# Patient Record
Sex: MALE | Race: WHITE | NOT HISPANIC OR LATINO | Employment: FULL TIME | URBAN - METROPOLITAN AREA
[De-identification: names, ages, dates, MRNs, and addresses within clinical notes are randomized per-mention and may not be internally consistent; named-entity substitution may affect disease eponyms.]

---

## 2020-12-10 ENCOUNTER — HOSPITAL ENCOUNTER (OUTPATIENT)
Dept: GENERAL RADIOLOGY | Facility: HOSPITAL | Age: 50
Discharge: HOME | End: 2020-12-10

## 2020-12-10 ENCOUNTER — APPOINTMENT (OUTPATIENT)
Dept: PREADMISSION TESTING | Facility: HOSPITAL | Age: 50
End: 2020-12-10

## 2020-12-10 VITALS — HEIGHT: 69 IN | WEIGHT: 184.6 LBS | BODY MASS INDEX: 27.34 KG/M2

## 2020-12-10 LAB
ABO GROUP BLD: NORMAL
ALBUMIN SERPL-MCNC: 5 G/DL (ref 3.5–5.2)
ALBUMIN/GLOB SERPL: 1.7 G/DL
ALP SERPL-CCNC: 76 U/L (ref 39–117)
ALT SERPL W P-5'-P-CCNC: 14 U/L (ref 1–41)
ANION GAP SERPL CALCULATED.3IONS-SCNC: 9 MMOL/L (ref 5–15)
AST SERPL-CCNC: 14 U/L (ref 1–40)
BASOPHILS # BLD AUTO: 0.07 10*3/MM3 (ref 0–0.2)
BASOPHILS NFR BLD AUTO: 1.2 % (ref 0–1.5)
BILIRUB SERPL-MCNC: 0.8 MG/DL (ref 0–1.2)
BILIRUB UR QL STRIP: NEGATIVE
BUN SERPL-MCNC: 11 MG/DL (ref 6–20)
BUN/CREAT SERPL: 10.7 (ref 7–25)
CALCIUM SPEC-SCNC: 10.4 MG/DL (ref 8.6–10.5)
CHLORIDE SERPL-SCNC: 99 MMOL/L (ref 98–107)
CLARITY UR: CLEAR
CO2 SERPL-SCNC: 30 MMOL/L (ref 22–29)
COLOR UR: YELLOW
CREAT SERPL-MCNC: 1.03 MG/DL (ref 0.76–1.27)
CRP SERPL-MCNC: 0.07 MG/DL (ref 0–0.5)
DEPRECATED RDW RBC AUTO: 36.9 FL (ref 37–54)
EOSINOPHIL # BLD AUTO: 0.07 10*3/MM3 (ref 0–0.4)
EOSINOPHIL NFR BLD AUTO: 1.2 % (ref 0.3–6.2)
ERYTHROCYTE [DISTWIDTH] IN BLOOD BY AUTOMATED COUNT: 11.9 % (ref 12.3–15.4)
ERYTHROCYTE [SEDIMENTATION RATE] IN BLOOD: 4 MM/HR (ref 0–15)
GFR SERPL CREATININE-BSD FRML MDRD: 77 ML/MIN/1.73
GLOBULIN UR ELPH-MCNC: 2.9 GM/DL
GLUCOSE SERPL-MCNC: 89 MG/DL (ref 65–99)
GLUCOSE UR STRIP-MCNC: NEGATIVE MG/DL
HBA1C MFR BLD: 5.1 % (ref 4.8–5.6)
HCT VFR BLD AUTO: 48.5 % (ref 37.5–51)
HGB BLD-MCNC: 16.6 G/DL (ref 13–17.7)
HGB UR QL STRIP.AUTO: NEGATIVE
IMM GRANULOCYTES # BLD AUTO: 0.01 10*3/MM3 (ref 0–0.05)
IMM GRANULOCYTES NFR BLD AUTO: 0.2 % (ref 0–0.5)
KETONES UR QL STRIP: NEGATIVE
LEUKOCYTE ESTERASE UR QL STRIP.AUTO: NEGATIVE
LYMPHOCYTES # BLD AUTO: 2.18 10*3/MM3 (ref 0.7–3.1)
LYMPHOCYTES NFR BLD AUTO: 36.9 % (ref 19.6–45.3)
MCH RBC QN AUTO: 29.2 PG (ref 26.6–33)
MCHC RBC AUTO-ENTMCNC: 34.2 G/DL (ref 31.5–35.7)
MCV RBC AUTO: 85.2 FL (ref 79–97)
MONOCYTES # BLD AUTO: 0.33 10*3/MM3 (ref 0.1–0.9)
MONOCYTES NFR BLD AUTO: 5.6 % (ref 5–12)
NEUTROPHILS NFR BLD AUTO: 3.25 10*3/MM3 (ref 1.7–7)
NEUTROPHILS NFR BLD AUTO: 54.9 % (ref 42.7–76)
NITRITE UR QL STRIP: NEGATIVE
NRBC BLD AUTO-RTO: 0 /100 WBC (ref 0–0.2)
PH UR STRIP.AUTO: 6.5 [PH] (ref 5–8)
PLATELET # BLD AUTO: 276 10*3/MM3 (ref 140–450)
PMV BLD AUTO: 9.9 FL (ref 6–12)
POTASSIUM SERPL-SCNC: 4.6 MMOL/L (ref 3.5–5.2)
PROT SERPL-MCNC: 7.9 G/DL (ref 6–8.5)
PROT UR QL STRIP: NEGATIVE
RBC # BLD AUTO: 5.69 10*6/MM3 (ref 4.14–5.8)
RH BLD: NEGATIVE
SODIUM SERPL-SCNC: 138 MMOL/L (ref 136–145)
SP GR UR STRIP: 1.01 (ref 1–1.03)
UROBILINOGEN UR QL STRIP: NORMAL
WBC # BLD AUTO: 5.91 10*3/MM3 (ref 3.4–10.8)

## 2020-12-10 PROCEDURE — 81003 URINALYSIS AUTO W/O SCOPE: CPT

## 2020-12-10 PROCEDURE — 36415 COLL VENOUS BLD VENIPUNCTURE: CPT

## 2020-12-10 PROCEDURE — 86900 BLOOD TYPING SEROLOGIC ABO: CPT

## 2020-12-10 PROCEDURE — 85652 RBC SED RATE AUTOMATED: CPT

## 2020-12-10 PROCEDURE — 86901 BLOOD TYPING SEROLOGIC RH(D): CPT

## 2020-12-10 PROCEDURE — 93005 ELECTROCARDIOGRAM TRACING: CPT

## 2020-12-10 PROCEDURE — 86140 C-REACTIVE PROTEIN: CPT

## 2020-12-10 PROCEDURE — 86870 RBC ANTIBODY IDENTIFICATION: CPT

## 2020-12-10 PROCEDURE — 86850 RBC ANTIBODY SCREEN: CPT

## 2020-12-10 PROCEDURE — 85025 COMPLETE CBC W/AUTO DIFF WBC: CPT

## 2020-12-10 PROCEDURE — 71046 X-RAY EXAM CHEST 2 VIEWS: CPT

## 2020-12-10 PROCEDURE — 86902 BLOOD TYPE ANTIGEN DONOR EA: CPT

## 2020-12-10 PROCEDURE — 80053 COMPREHEN METABOLIC PANEL: CPT

## 2020-12-10 PROCEDURE — 83036 HEMOGLOBIN GLYCOSYLATED A1C: CPT

## 2020-12-10 PROCEDURE — 93010 ELECTROCARDIOGRAM REPORT: CPT | Performed by: INTERNAL MEDICINE

## 2020-12-10 RX ORDER — ESCITALOPRAM OXALATE 10 MG/1
10 TABLET ORAL DAILY
COMMUNITY

## 2020-12-10 ASSESSMENT — HOOS JR
HOOS JR SCORE: 8.104
HOOS JR SCORE: 23

## 2020-12-10 NOTE — PAT
Patient to apply Chlorhexadine wipes  to surgical area (as instructed) the night before procedure and the AM of procedure. Wipes provided.    Patient instructed to drink 20 ounces (or until full) of Gatorade and it needs to be completed 1 hour before given arrival time for procedure (NO RED Gatorade)    Patient verbalized understanding.    Chest x-ray after PAT.     Joint book given to pt, states he will watch on line videos.

## 2020-12-11 LAB
ANTI-C: NORMAL
BLD GP AB SCN SERPL QL: POSITIVE
QT INTERVAL: 386 MS
QTC INTERVAL: 378 MS

## 2020-12-11 PROCEDURE — 86902 BLOOD TYPE ANTIGEN DONOR EA: CPT

## 2020-12-18 ENCOUNTER — ANESTHESIA EVENT (OUTPATIENT)
Dept: PERIOP | Facility: HOSPITAL | Age: 50
End: 2020-12-18

## 2020-12-18 ENCOUNTER — APPOINTMENT (OUTPATIENT)
Dept: PREADMISSION TESTING | Facility: HOSPITAL | Age: 50
End: 2020-12-18

## 2020-12-18 PROCEDURE — U0004 COV-19 TEST NON-CDC HGH THRU: HCPCS | Performed by: INTERNAL MEDICINE

## 2020-12-19 LAB — SARS-COV-2 RNA RESP QL NAA+PROBE: NOT DETECTED

## 2020-12-21 ENCOUNTER — ANESTHESIA (OUTPATIENT)
Dept: PERIOP | Facility: HOSPITAL | Age: 50
End: 2020-12-21

## 2020-12-21 ENCOUNTER — HOSPITAL ENCOUNTER (OUTPATIENT)
Facility: HOSPITAL | Age: 50
Discharge: HOME | End: 2020-12-22
Attending: ORTHOPAEDIC SURGERY | Admitting: INTERNAL MEDICINE

## 2020-12-21 ENCOUNTER — APPOINTMENT (OUTPATIENT)
Dept: GENERAL RADIOLOGY | Facility: HOSPITAL | Age: 50
End: 2020-12-21

## 2020-12-21 DIAGNOSIS — M16.11 ARTHRITIS OF RIGHT HIP: ICD-10-CM

## 2020-12-21 DIAGNOSIS — Z96.641 STATUS POST TOTAL HIP REPLACEMENT, RIGHT: Primary | ICD-10-CM

## 2020-12-21 PROBLEM — F41.9 ANXIETY: Status: ACTIVE | Noted: 2020-12-21

## 2020-12-21 PROBLEM — K21.9 GERD (GASTROESOPHAGEAL REFLUX DISEASE): Status: ACTIVE | Noted: 2020-12-21

## 2020-12-21 LAB
ABO GROUP BLD: NORMAL
ANTI-C: NORMAL
BLD GP AB SCN SERPL QL: POSITIVE
RH BLD: NEGATIVE
T&S EXPIRATION DATE: NORMAL

## 2020-12-21 PROCEDURE — 86900 BLOOD TYPING SEROLOGIC ABO: CPT | Performed by: ORTHOPAEDIC SURGERY

## 2020-12-21 PROCEDURE — 63710000001 PROMETHAZINE PER 12.5 MG: Performed by: ORTHOPAEDIC SURGERY

## 2020-12-21 PROCEDURE — 86870 RBC ANTIBODY IDENTIFICATION: CPT | Performed by: ORTHOPAEDIC SURGERY

## 2020-12-21 PROCEDURE — 25010000002 MIDAZOLAM PER 1 MG: Performed by: ANESTHESIOLOGY

## 2020-12-21 PROCEDURE — 86922 COMPATIBILITY TEST ANTIGLOB: CPT

## 2020-12-21 PROCEDURE — 25010000003 CEFAZOLIN IN DEXTROSE 2-4 GM/100ML-% SOLUTION: Performed by: ORTHOPAEDIC SURGERY

## 2020-12-21 PROCEDURE — 25010000002 KETOROLAC TROMETHAMINE PER 15 MG: Performed by: ORTHOPAEDIC SURGERY

## 2020-12-21 PROCEDURE — 73502 X-RAY EXAM HIP UNI 2-3 VIEWS: CPT

## 2020-12-21 PROCEDURE — 86901 BLOOD TYPING SEROLOGIC RH(D): CPT | Performed by: ORTHOPAEDIC SURGERY

## 2020-12-21 PROCEDURE — 25010000002 DEXAMETHASONE PER 1 MG: Performed by: NURSE ANESTHETIST, CERTIFIED REGISTERED

## 2020-12-21 PROCEDURE — 76000 FLUOROSCOPY <1 HR PHYS/QHP: CPT

## 2020-12-21 PROCEDURE — 86902 BLOOD TYPE ANTIGEN DONOR EA: CPT

## 2020-12-21 PROCEDURE — 25010000002 PROPOFOL 10 MG/ML EMULSION: Performed by: NURSE ANESTHETIST, CERTIFIED REGISTERED

## 2020-12-21 PROCEDURE — 86850 RBC ANTIBODY SCREEN: CPT | Performed by: ORTHOPAEDIC SURGERY

## 2020-12-21 PROCEDURE — 27130 TOTAL HIP ARTHROPLASTY: CPT | Performed by: PHYSICIAN ASSISTANT

## 2020-12-21 PROCEDURE — 25010000002 ROPIVACAINE PER 1 MG: Performed by: ORTHOPAEDIC SURGERY

## 2020-12-21 PROCEDURE — 86920 COMPATIBILITY TEST SPIN: CPT

## 2020-12-21 PROCEDURE — 25010000002 ONDANSETRON PER 1 MG: Performed by: NURSE ANESTHETIST, CERTIFIED REGISTERED

## 2020-12-21 PROCEDURE — 25010000002 FENTANYL CITRATE (PF) 100 MCG/2ML SOLUTION: Performed by: NURSE ANESTHETIST, CERTIFIED REGISTERED

## 2020-12-21 PROCEDURE — 25010000002 CEFAZOLIN PER 500 MG: Performed by: ORTHOPAEDIC SURGERY

## 2020-12-21 PROCEDURE — C1776 JOINT DEVICE (IMPLANTABLE): HCPCS | Performed by: ORTHOPAEDIC SURGERY

## 2020-12-21 DEVICE — POLARSTEM COLLAR STANDARD                                    NON-CEMENTED WITH TI/HA 6
Type: IMPLANTABLE DEVICE | Site: HIP | Status: FUNCTIONAL
Brand: POLARSTEM

## 2020-12-21 DEVICE — R3 3 HOLE ACETABULAR SHELL 56MM
Type: IMPLANTABLE DEVICE | Site: HIP | Status: FUNCTIONAL
Brand: R3 ACETABULAR

## 2020-12-21 DEVICE — R3 0 DEGREE XLPE ACETABULAR LINER                                    36MM INNER DIAMETER X OUTER DIAMETER 56MM
Type: IMPLANTABLE DEVICE | Site: HIP | Status: FUNCTIONAL
Brand: R3

## 2020-12-21 DEVICE — REFLECTION SPHERICAL HEAD SCREW 25MM
Type: IMPLANTABLE DEVICE | Site: HIP | Status: FUNCTIONAL
Brand: REFLECTION

## 2020-12-21 DEVICE — OXINIUM FEMORAL HEAD 12/14 TAPER                                    36 MM M/+4
Type: IMPLANTABLE DEVICE | Site: HIP | Status: FUNCTIONAL
Brand: OXINIUM

## 2020-12-21 DEVICE — IMPLANTABLE DEVICE: Type: IMPLANTABLE DEVICE | Site: HIP | Status: FUNCTIONAL

## 2020-12-21 RX ORDER — ESCITALOPRAM OXALATE 10 MG/1
10 TABLET ORAL DAILY
Status: DISCONTINUED | OUTPATIENT
Start: 2020-12-21 | End: 2020-12-22 | Stop reason: HOSPADM

## 2020-12-21 RX ORDER — ACETAMINOPHEN, ASPIRIN AND CAFFEINE 250; 250; 65 MG/1; MG/1; MG/1
1 TABLET, FILM COATED ORAL EVERY 6 HOURS PRN
Status: DISCONTINUED | OUTPATIENT
Start: 2020-12-21 | End: 2020-12-22 | Stop reason: HOSPADM

## 2020-12-21 RX ORDER — PANTOPRAZOLE SODIUM 40 MG/1
40 TABLET, DELAYED RELEASE ORAL DAILY
Status: DISCONTINUED | OUTPATIENT
Start: 2020-12-21 | End: 2020-12-22 | Stop reason: HOSPADM

## 2020-12-21 RX ORDER — PROMETHAZINE HYDROCHLORIDE 25 MG/1
25 TABLET ORAL ONCE AS NEEDED
Status: DISCONTINUED | OUTPATIENT
Start: 2020-12-21 | End: 2020-12-21 | Stop reason: HOSPADM

## 2020-12-21 RX ORDER — PROMETHAZINE HYDROCHLORIDE 12.5 MG/1
12.5 TABLET ORAL EVERY 6 HOURS PRN
Status: DISCONTINUED | OUTPATIENT
Start: 2020-12-21 | End: 2020-12-22 | Stop reason: HOSPADM

## 2020-12-21 RX ORDER — ACETAMINOPHEN, ASPIRIN AND CAFFEINE 250; 250; 65 MG/1; MG/1; MG/1
1 TABLET, FILM COATED ORAL EVERY 6 HOURS PRN
COMMUNITY

## 2020-12-21 RX ORDER — KETOROLAC TROMETHAMINE 15 MG/ML
15 INJECTION, SOLUTION INTRAMUSCULAR; INTRAVENOUS EVERY 6 HOURS PRN
Status: DISCONTINUED | OUTPATIENT
Start: 2020-12-21 | End: 2020-12-22 | Stop reason: HOSPADM

## 2020-12-21 RX ORDER — FAMOTIDINE 20 MG/1
20 TABLET, FILM COATED ORAL ONCE
Status: COMPLETED | OUTPATIENT
Start: 2020-12-21 | End: 2020-12-21

## 2020-12-21 RX ORDER — LIDOCAINE HYDROCHLORIDE 10 MG/ML
0.5 INJECTION, SOLUTION EPIDURAL; INFILTRATION; INTRACAUDAL; PERINEURAL ONCE AS NEEDED
Status: COMPLETED | OUTPATIENT
Start: 2020-12-21 | End: 2020-12-21

## 2020-12-21 RX ORDER — MAGNESIUM HYDROXIDE 1200 MG/15ML
LIQUID ORAL AS NEEDED
Status: DISCONTINUED | OUTPATIENT
Start: 2020-12-21 | End: 2020-12-21 | Stop reason: HOSPADM

## 2020-12-21 RX ORDER — CEFAZOLIN SODIUM 2 G/100ML
2 INJECTION, SOLUTION INTRAVENOUS ONCE
Status: COMPLETED | OUTPATIENT
Start: 2020-12-21 | End: 2020-12-21

## 2020-12-21 RX ORDER — FAMOTIDINE 10 MG/ML
20 INJECTION, SOLUTION INTRAVENOUS ONCE
Status: DISCONTINUED | OUTPATIENT
Start: 2020-12-21 | End: 2020-12-21 | Stop reason: HOSPADM

## 2020-12-21 RX ORDER — SODIUM CHLORIDE, SODIUM LACTATE, POTASSIUM CHLORIDE, CALCIUM CHLORIDE 600; 310; 30; 20 MG/100ML; MG/100ML; MG/100ML; MG/100ML
9 INJECTION, SOLUTION INTRAVENOUS CONTINUOUS
Status: DISCONTINUED | OUTPATIENT
Start: 2020-12-21 | End: 2020-12-22 | Stop reason: HOSPADM

## 2020-12-21 RX ORDER — SODIUM CHLORIDE 9 MG/ML
150 INJECTION, SOLUTION INTRAVENOUS CONTINUOUS
Status: DISCONTINUED | OUTPATIENT
Start: 2020-12-21 | End: 2020-12-22 | Stop reason: HOSPADM

## 2020-12-21 RX ORDER — ASPIRIN 325 MG
325 TABLET ORAL DAILY
Status: DISCONTINUED | OUTPATIENT
Start: 2020-12-22 | End: 2020-12-22 | Stop reason: HOSPADM

## 2020-12-21 RX ORDER — ONDANSETRON 2 MG/ML
INJECTION INTRAMUSCULAR; INTRAVENOUS AS NEEDED
Status: DISCONTINUED | OUTPATIENT
Start: 2020-12-21 | End: 2020-12-21 | Stop reason: SURG

## 2020-12-21 RX ORDER — MIDAZOLAM HYDROCHLORIDE 1 MG/ML
1 INJECTION INTRAMUSCULAR; INTRAVENOUS
Status: COMPLETED | OUTPATIENT
Start: 2020-12-21 | End: 2020-12-21

## 2020-12-21 RX ORDER — BUPIVACAINE HYDROCHLORIDE 5 MG/ML
INJECTION, SOLUTION PERINEURAL
Status: COMPLETED | OUTPATIENT
Start: 2020-12-21 | End: 2020-12-21

## 2020-12-21 RX ORDER — HYDROCODONE BITARTRATE AND ACETAMINOPHEN 5; 325 MG/1; MG/1
1 TABLET ORAL EVERY 4 HOURS PRN
Status: DISCONTINUED | OUTPATIENT
Start: 2020-12-21 | End: 2020-12-22 | Stop reason: HOSPADM

## 2020-12-21 RX ORDER — HYDROMORPHONE HYDROCHLORIDE 1 MG/ML
0.5 INJECTION, SOLUTION INTRAMUSCULAR; INTRAVENOUS; SUBCUTANEOUS
Status: DISCONTINUED | OUTPATIENT
Start: 2020-12-21 | End: 2020-12-21 | Stop reason: HOSPADM

## 2020-12-21 RX ORDER — DEXAMETHASONE SODIUM PHOSPHATE 4 MG/ML
INJECTION, SOLUTION INTRA-ARTICULAR; INTRALESIONAL; INTRAMUSCULAR; INTRAVENOUS; SOFT TISSUE AS NEEDED
Status: DISCONTINUED | OUTPATIENT
Start: 2020-12-21 | End: 2020-12-21 | Stop reason: SURG

## 2020-12-21 RX ORDER — CEFAZOLIN SODIUM 2 G/100ML
2 INJECTION, SOLUTION INTRAVENOUS EVERY 8 HOURS
Status: COMPLETED | OUTPATIENT
Start: 2020-12-21 | End: 2020-12-22

## 2020-12-21 RX ORDER — SODIUM CHLORIDE 0.9 % (FLUSH) 0.9 %
10 SYRINGE (ML) INJECTION EVERY 12 HOURS SCHEDULED
Status: DISCONTINUED | OUTPATIENT
Start: 2020-12-21 | End: 2020-12-21 | Stop reason: HOSPADM

## 2020-12-21 RX ORDER — FENTANYL CITRATE 50 UG/ML
50 INJECTION, SOLUTION INTRAMUSCULAR; INTRAVENOUS
Status: DISCONTINUED | OUTPATIENT
Start: 2020-12-21 | End: 2020-12-21 | Stop reason: HOSPADM

## 2020-12-21 RX ORDER — HYDROMORPHONE HYDROCHLORIDE 1 MG/ML
0.5 INJECTION, SOLUTION INTRAMUSCULAR; INTRAVENOUS; SUBCUTANEOUS
Status: DISCONTINUED | OUTPATIENT
Start: 2020-12-21 | End: 2020-12-22 | Stop reason: HOSPADM

## 2020-12-21 RX ORDER — PREGABALIN 75 MG/1
75 CAPSULE ORAL ONCE
Status: COMPLETED | OUTPATIENT
Start: 2020-12-21 | End: 2020-12-21

## 2020-12-21 RX ORDER — LABETALOL HYDROCHLORIDE 5 MG/ML
10 INJECTION, SOLUTION INTRAVENOUS EVERY 4 HOURS PRN
Status: DISCONTINUED | OUTPATIENT
Start: 2020-12-21 | End: 2020-12-22 | Stop reason: HOSPADM

## 2020-12-21 RX ORDER — PROMETHAZINE HYDROCHLORIDE 25 MG/1
25 SUPPOSITORY RECTAL ONCE AS NEEDED
Status: DISCONTINUED | OUTPATIENT
Start: 2020-12-21 | End: 2020-12-21 | Stop reason: HOSPADM

## 2020-12-21 RX ORDER — SODIUM CHLORIDE 0.9 % (FLUSH) 0.9 %
10 SYRINGE (ML) INJECTION AS NEEDED
Status: DISCONTINUED | OUTPATIENT
Start: 2020-12-21 | End: 2020-12-21 | Stop reason: HOSPADM

## 2020-12-21 RX ORDER — NALOXONE HCL 0.4 MG/ML
0.1 VIAL (ML) INJECTION
Status: DISCONTINUED | OUTPATIENT
Start: 2020-12-21 | End: 2020-12-22 | Stop reason: HOSPADM

## 2020-12-21 RX ADMIN — HYDROCODONE BITARTRATE AND ACETAMINOPHEN 1 TABLET: 5; 325 TABLET ORAL at 19:46

## 2020-12-21 RX ADMIN — PROMETHAZINE HYDROCHLORIDE 12.5 MG: 12.5 TABLET ORAL at 20:17

## 2020-12-21 RX ADMIN — PANTOPRAZOLE SODIUM 40 MG: 40 TABLET, DELAYED RELEASE ORAL at 18:55

## 2020-12-21 RX ADMIN — ONDANSETRON 4 MG: 2 INJECTION INTRAMUSCULAR; INTRAVENOUS at 14:52

## 2020-12-21 RX ADMIN — SODIUM CHLORIDE 150 ML/HR: 9 INJECTION, SOLUTION INTRAVENOUS at 18:56

## 2020-12-21 RX ADMIN — Medication 1000 MG: at 14:52

## 2020-12-21 RX ADMIN — FAMOTIDINE 20 MG: 20 TABLET, FILM COATED ORAL at 09:14

## 2020-12-21 RX ADMIN — ESCITALOPRAM OXALATE 10 MG: 10 TABLET ORAL at 18:55

## 2020-12-21 RX ADMIN — BUPIVACAINE HYDROCHLORIDE 1.8 ML: 5 INJECTION, SOLUTION PERINEURAL at 13:06

## 2020-12-21 RX ADMIN — LIDOCAINE HYDROCHLORIDE 0.5 ML: 10 INJECTION, SOLUTION EPIDURAL; INFILTRATION; INTRACAUDAL; PERINEURAL at 09:13

## 2020-12-21 RX ADMIN — KETOROLAC TROMETHAMINE 15 MG: 15 INJECTION, SOLUTION INTRAMUSCULAR; INTRAVENOUS at 21:10

## 2020-12-21 RX ADMIN — MIDAZOLAM 1 MG: 1 INJECTION INTRAMUSCULAR; INTRAVENOUS at 09:44

## 2020-12-21 RX ADMIN — MIDAZOLAM 1 MG: 1 INJECTION INTRAMUSCULAR; INTRAVENOUS at 09:53

## 2020-12-21 RX ADMIN — MUPIROCIN 1 APPLICATION: 20 OINTMENT TOPICAL at 09:14

## 2020-12-21 RX ADMIN — CEFAZOLIN SODIUM 2 G: 10 INJECTION, POWDER, FOR SOLUTION INTRAVENOUS at 21:38

## 2020-12-21 RX ADMIN — SODIUM CHLORIDE, POTASSIUM CHLORIDE, SODIUM LACTATE AND CALCIUM CHLORIDE 9 ML/HR: 600; 310; 30; 20 INJECTION, SOLUTION INTRAVENOUS at 09:14

## 2020-12-21 RX ADMIN — FENTANYL CITRATE 50 MCG: 50 INJECTION, SOLUTION INTRAMUSCULAR; INTRAVENOUS at 15:40

## 2020-12-21 RX ADMIN — PROPOFOL 88 MCG/KG/MIN: 10 INJECTION, EMULSION INTRAVENOUS at 13:11

## 2020-12-21 RX ADMIN — DEXAMETHASONE SODIUM PHOSPHATE 8 MG: 4 INJECTION, SOLUTION INTRAMUSCULAR; INTRAVENOUS at 13:11

## 2020-12-21 RX ADMIN — PREGABALIN 75 MG: 75 CAPSULE ORAL at 09:14

## 2020-12-21 RX ADMIN — Medication 1000 MG: at 13:11

## 2020-12-21 RX ADMIN — CEFAZOLIN SODIUM 2 G: 2 INJECTION, SOLUTION INTRAVENOUS at 13:01

## 2020-12-21 NOTE — OP NOTE
TOTAL HIP ARTHROPLASTY ANTERIOR  Progress Note    Dominguez Calderon  12/21/2020    Pre-op Diagnosis:   Right hip osteoarthritis       Post-Op Diagnosis Codes:  Right hip osteoarthritis    Procedure/CPT® Codes:  70102    Procedure(s):  TOTAL ANTERIOR RIGHT HIP ARTHROPLASTY    Surgeon(s):  Jared Mario MD    Anesthesia: Spinal    Staff:   Circulator: Sofia Marion RN; Faustino Kwon RN  Radiology Technologist: Noble Wong  Scrub Person: Matilde, April; Sheila Kaye  Vendor Representative: Collin Hyatt  Nursing Assistant: Tonya Quintanilla; Shama Rolon PCT  Assistant: Stephanie Price PA  Assistant: Stephanie Price PA      Estimated Blood Loss: 500 mL    Urine Voided: * No values recorded between 12/21/2020  1:01 PM and 12/21/2020  3:13 PM *    Specimens:                None          Drains: * No LDAs found *    Findings: Eburnated bone anterior superior lateral.  Moderate anterior inferior impinging osteophyte.    Complications: None    Assistant: Stephanie Price PA  was responsible for performing the following activities: Retraction, Suction, Irrigation, Suturing, Closing, Placing Dressing and Debridement in preparation for component placement and their skilled assistance was necessary for the success of this case.    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 X-rays show complete loss superior joint space with femoral head flattening and lateralization of the femoral head.  Pain is limiting routine daily activities. Risks benefits indications and rationale for total hip arthroplasty discussed at length with patient.  Patient is advised on possibility of perioperative medical complications requiring prolonged hospitalization as well as infectious or  wound complications requiring further surgery.  Patient signs own consent after all questions are answered.      Procedure:  While in the OR spinal anesthetic achieved with satisfactory level. Turned to the supine position and prepped and draped in standard fashion for anterior approach to the right hip on the Meredith table. Fluoroscopy used to assess limb lengths. These were restored with final component selection and position. Incision made over the tensor fascia dann and routine dissection carried down to raise the fascia over the medial margin of the tensor muscle belly. Circumflex vessels were identified and cauterized. Bleeding reasonably controlled with estimated total blood loss 500 mL. Femoral neck isolated. T-shaped capsulotomy created. Mild effusion relieved. Standard neck cut made and head removed without difficulty noting large area of eburnated bone in the anterior and superior lateral aspect of the femoral head and anterior inferior moderate-sized impinging osteophyte. Acetabulum was exposed circumferentially. Acetabulum reamed from size 47/56 noting satisfactory exposure of the subchondral bone with the size 56. Size 56 final component was seated with excellent press-fit characteristics.  1 additional fixation screw was placed due to concern for bone quality medially. Polyethylene insert placed without difficulty. Horizontal tilt was thought to be approximately 40-45° from Hilgenreiner's line. Anteversion approximately 25°. Component was under the anterior inferior acetabular margin. Acetabulum was thoroughly irrigated before during and after component placement.      Attention turned to the proximal femur. Trochanteric hook placed and hip externally rotated eventually to 160° after complete posterior lateral releases. Standard piriformis landmarks were used.  Minor soft stripping from the apex of the greater trochanter was noted.  Broached from size 0/1 to size 6 with good position relative to standard  landmarks. Calcar reamer was passed after trials showed satisfactory recovery of limb lengths.  Retractors initiated the focal rim defect on the posterior aspect involving the calcar.  Final component seated completely relative to the reaming with excellent rotational stability. Limb lengths appeared to be restored best with +4x36 mm head and neck adapter. Final components assembled and reduced.  Stability at this point appear to be good up to 100 degrees with the hip in neutral extension and complete with the hip even slightly flexed.  Wound thoroughly irrigated and closed in layers without a drain. Bleeding appeared to be well controlled at closure. Joint space injected with ropivacaine through a spinal needle placed percutaneously during closure. Standard Satnam dressing applied. Final counts were correct. Patient stable to recovery having tolerated procedure well throughout.          Jared Mario MD     Date: 12/21/2020  Time: 15:17 EST

## 2020-12-21 NOTE — ANESTHESIA PREPROCEDURE EVALUATION
Anesthesia Evaluation     Patient summary reviewed   history of anesthetic complications (reports recall during wisdom teeth extraction at dental office):  NPO Solid Status: > 8 hours  NPO Liquid Status: > 2 hours           Airway   Mallampati: I  TM distance: >3 FB  Neck ROM: full  No difficulty expected  Dental - normal exam     Pulmonary - normal exam   (+) asthma (childhood),  (-) not a smoker  Cardiovascular - normal exam  Exercise tolerance: good (4-7 METS)    ECG reviewed    (-) CAD, angina, CHF, cardiac stents, DVT    ROS comment: 12/10/20 -ekg SB to 58.    Neuro/Psych  (+) dizziness/light headedness, psychiatric history Anxiety,     GI/Hepatic/Renal/Endo    (+)  hiatal hernia, GERD well controlled,    (-) hepatitis, liver disease, no renal disease, diabetes, no thyroid disorder    Musculoskeletal     Abdominal    Substance History - negative use     OB/GYN          Other        ROS/Med Hx Other: hgb 16.6 plt 276 k 4.6  No GERD currently                Anesthesia Plan    ASA 2     spinal and MAC   (Risks and benefits of general anesthesia discussed with patient (including MI, CVA, death, recall, aspiration), questions answered, agreeable to proceed.  Discussed conversion to GA if needed.  +Hx antiC ab.  )  intravenous induction     Anesthetic plan, all risks, benefits, and alternatives have been provided, discussed and informed consent has been obtained with: patient.  Use of blood products discussed with patient  Consented to blood products.   Plan discussed with CRNA.

## 2020-12-21 NOTE — ANESTHESIA POSTPROCEDURE EVALUATION
Patient: Dominguez Calderon    Procedure Summary     Date: 12/21/20 Room / Location:  [facility]  /  [facility]    Anesthesia Start: 1301 Anesthesia Stop: 1522    Procedure: TOTAL ANTERIOR RIGHT HIP ARTHROPLASTY (Right Hip) Diagnosis:     Surgeon: Jared Mario MD Provider: Brigette Prather DO    Anesthesia Type: spinal, MAC ASA Status: 2          Anesthesia Type: spinal, MAC    Vitals  Vitals Value Taken Time   /74 12/21/20 1514   Temp 97.8 °F (36.6 °C) 12/21/20 1513   Pulse 50 12/21/20 1521   Resp 16 12/21/20 1513   SpO2 100 % 12/21/20 1521   Vitals shown include unvalidated device data.        Post Anesthesia Care and Evaluation    Patient location during evaluation: PACU  Patient participation: complete - patient participated  Level of consciousness: awake and alert  Pain management: adequate  Airway patency: patent  Anesthetic complications: No anesthetic complications  PONV Status: none  Cardiovascular status: hemodynamically stable and acceptable  Respiratory status: nonlabored ventilation, acceptable and nasal cannula  Hydration status: acceptable

## 2020-12-21 NOTE — ANESTHESIA PROCEDURE NOTES
Spinal Block      Patient reassessed immediately prior to procedure    Patient location during procedure: OR  Indication:at surgeon's request  Performed By  CRNA: Elli Felix CRNA  Preanesthetic Checklist  Completed: patient identified, site marked, surgical consent, pre-op evaluation, timeout performed, IV checked, risks and benefits discussed and monitors and equipment checked  Spinal Block Prep:  Patient Position:sitting  Sterile Tech:cap, gloves, sterile barriers and mask  Prep:Chloraprep  Patient Monitoring:blood pressure monitoring, continuous pulse oximetry and EKG  Spinal Block Procedure  Approach:midline  Guidance:landmark technique and palpation technique  Location:L4-L5  Needle Type:Sprotte  Needle Gauge:25 G  Placement of Spinal needle event:cerebrospinal fluid aspirated  Paresthesia: no  Fluid Appearance:clear  Medications: bupivacaine (MARCAINE) 0.5 % injection, 1.8 mL  Med Administered at 12/21/2020 1:06 PM   Post Assessment  Patient Tolerance:patient tolerated the procedure well with no apparent complications  Complications no  Additional Notes  Procedure:  Pt assisted to sitting position, with legs in position of comfort over side of bed.  Pt. instructed in optimal spine presentation, the spine was prepped/ Draped and the skin at insertion site was anesthetized with 1% Lidocaine 2 ml.  The spinal needle was then advanced until CSF flow was obtained and LA was injected:

## 2020-12-21 NOTE — H&P
Patient Name: Dominguez Calderon  MRN: [id number]  : 1970  DOS: 2020    Attending: Jared Mario MD    Primary Care Provider: Tj Dunn MD      Chief complaint: Right hip pain    Subjective   Patient is a pleasant 50 y.o. male presented for scheduled surgery by Dr. 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 X-rays show complete loss superior joint space with femoral head flattening and lateralization of the femoral head.  Pain is limiting routine daily activities. Risks benefits indications and rationale for total hip arthroplasty discussed at length with patient.  Patient is advised on possibility of perioperative medical complications requiring prolonged hospitalization as well as infectious or wound complications requiring further surgery.  Patient signs own consent after all questions are answered.)  Patient underwent right total hip arthroplasty, anterior, under spinal anesthesia tolerated surgery well, was admitted for further management.  Seen in his room postoperatively, he is doing well, good pain control.  No complaints of nausea, vomiting, or shortness of breath.  Patient has no history of DVT or PE.    Allergies:  No Known Allergies    Meds:  Medications Prior to Admission   Medication Sig Dispense Refill Last Dose   • aspirin-acetaminophen-caffeine (EXCEDRIN MIGRAINE) 250-250-65 MG per tablet Take 1 tablet by mouth Every 6 (Six) Hours As Needed for Headache.   Past Week   • escitalopram (LEXAPRO) 10 MG tablet Take 10 mg by mouth Daily. Pt takes every other day.   Past Week   • esomeprazole (nexIUM) 20 MG capsule Take 20 mg by mouth As Needed.   Past Week         History:   Past Medical History:   Diagnosis Date   • Acid reflux    • Anxiety    • Hiatal hernia    • Shingles     history of    • Vertigo     history of... has resolved      Past Surgical History:   Procedure Laterality Date   • WISDOM TOOTH  EXTRACTION       History reviewed. No pertinent family history.  Social History     Tobacco Use   • Smoking status: Never Smoker   • Smokeless tobacco: Current User     Types: Chew   Substance Use Topics   • Alcohol use: Not Currently   • Drug use: Not Currently   Special .  .  Lives with his wife in [facility]    Review of Systems  Pertinent items are noted in HPI, all other systems reviewed and negative    Vital Signs  /89 (BP Location: Right arm, Patient Position: Lying)   Pulse 66   Temp 97.8 °F (36.6 °C) (Temporal)   Resp 18   SpO2 100%     Physical Exam:    General Appearance:    Alert, cooperative, in no acute distress   Head:    Normocephalic, without obvious abnormality, atraumatic   Eyes:            Lids and lashes normal, conjunctivae and sclerae normal, no   icterus, no pallor, corneas clear    Ears:    Ears appear intact with no abnormalities noted   Throat:   No oral lesions, no thrush, oral mucosa moist   Neck:   No adenopathy, supple, trachea midline, no thyromegaly         Lungs:     Clear to auscultation,respirations regular, even and   unlabored. No wheezes or rales.    Heart:    Regular rhythm and normal rate, normal S1 and S2, no murmur, no gallop   Abdomen:     Normal bowel sounds, no masses, no organomegaly, soft        non-tender, non-distended, no guarding, no rebound                 tenderness   Genitalia:    Deferred   Extremities:  Right LE, CDI dressing aury dressing system over right hip incision.  No clubbing, cyanosis, or edema distally.   Pulses:   Pulses palpable and equal bilaterally   Skin:   No bleeding, bruising or rash   Neurologic:   Cranial nerves 2 - 12 grossly intact, intact flexion and dorsiflexion bilateral feet.      I reviewed the patient's new clinical results.             Invalid input(s): NEUTOPHILPCT,  EOSPCT        Invalid input(s): LABALBU, PROT  Lab Results   Component Value Date    HGBA1C 5.10 12/10/2020     Results for DAVID  SOLEDAD ARAIZA (MRN [id number]) as of 12/21/2020 21:55   Ref. Range 12/10/2020 17:20   Glucose Latest Ref Range: 65 - 99 mg/dL 89   Sodium Latest Ref Range: 136 - 145 mmol/L 138   Potassium Latest Ref Range: 3.5 - 5.2 mmol/L 4.6   CO2 Latest Ref Range: 22.0 - 29.0 mmol/L 30.0 (H)   Chloride Latest Ref Range: 98 - 107 mmol/L 99   Anion Gap Latest Ref Range: 5.0 - 15.0 mmol/L 9.0   Creatinine Latest Ref Range: 0.76 - 1.27 mg/dL 1.03   BUN Latest Ref Range: 6 - 20 mg/dL 11   BUN/Creatinine Ratio Latest Ref Range: 7.0 - 25.0  10.7   Calcium Latest Ref Range: 8.6 - 10.5 mg/dL 10.4   eGFR Non  Am Latest Ref Range: >60 mL/min/1.73 77   Alkaline Phosphatase Latest Ref Range: 39 - 117 U/L 76   Total Protein Latest Ref Range: 6.0 - 8.5 g/dL 7.9   ALT (SGPT) Latest Ref Range: 1 - 41 U/L 14   AST (SGOT) Latest Ref Range: 1 - 40 U/L 14   Total Bilirubin Latest Ref Range: 0.0 - 1.2 mg/dL 0.8   Albumin Latest Ref Range: 3.50 - 5.20 g/dL 5.00   Globulin Latest Units: gm/dL 2.9   A/G Ratio Latest Units: g/dL 1.7     Results for DAVID SOLEDAD TEODORA (MRN [id number]) as of 12/21/2020 21:55   Ref. Range 12/10/2020 17:20   WBC Latest Ref Range: 3.40 - 10.80 10*3/mm3 5.91   RBC Latest Ref Range: 4.14 - 5.80 10*6/mm3 5.69   Hemoglobin Latest Ref Range: 13.0 - 17.7 g/dL 16.6   Hematocrit Latest Ref Range: 37.5 - 51.0 % 48.5   RDW Latest Ref Range: 12.3 - 15.4 % 11.9 (L)   MCV Latest Ref Range: 79.0 - 97.0 fL 85.2   MCH Latest Ref Range: 26.6 - 33.0 pg 29.2   MCHC Latest Ref Range: 31.5 - 35.7 g/dL 34.2   MPV Latest Ref Range: 6.0 - 12.0 fL 9.9   Platelets Latest Ref Range: 140 - 450 10*3/mm3 276       Assessment and Plan:       Status post total hip replacement, right    Arthritis of right hip    Anxiety    GERD (gastroesophageal reflux disease)      Plan:    1. PT/OT,  Weight bearing as tolerated right LE.  2. Pain control-prns  3. IS-encourage  4. DVT proph- Mechanicals and aspirin  5. Bowel regimen  6. Resume home medications as  appropriate  7. Monitor post-op labs  8. DC planning for home    -Anxiety: Resume home regimen.     -GERD:  Resume PPI.  Formulary substitution when indicated.    Discussed with patient postop management plan, he expressed understanding and agreement.    Dragon disclaimer:  Part of this encounter note is an electronic transcription/translation of spoken language to printed text. The electronic translation of spoken language may permit erroneous, or at times, nonsensical words or phrases to be inadvertently transcribed; Although I have reviewed the note for such errors, some may still exist.    Suraj Byrd MD  12/21/20  14:46 EST

## 2020-12-21 NOTE — PROGRESS NOTES
Spinal anesthesia is never completely resolved.  He still has some mild weakness of toe and ankle dorsiflexors with good sensation of the dorsal foot.  With the hip flexed to about 20 to 25 degrees there appears to be excellent stability of the hip moving into external rotation.  From his description this is close to his functional position prior to surgery.  He is instructed on anterior hip cautions especially keeping the hip forward flexed with gait and with transitions.  He should sleep with 1-2 pillows under the right knee as well as 1 to 2 pills of the shoulder at night.  We will follow him closely and have revision options available if dislocation occurs again.

## 2020-12-21 NOTE — H&P
Pre-Op H&P  Dominguez Calderon  [id number]  1970      Chief complaint: Right hip pain      Subjective:  Patient is a 50 y.o.male presents for scheduled surgery by Dr. Mario. He anticipates a right total hip arthroplasty today. His hip has been painful for many years. He denies use of assistive device for ambulation or recent falls.       Review of Systems:  Constitutional-- No fever, chills or sweats. No fatigue.  CV-- No chest pain, palpitation or syncope  Resp-- No SOB, cough, hemoptysis  Skin--No rashes or lesions      Allergies: No Known Allergies      Home Meds:  Medications Prior to Admission   Medication Sig Dispense Refill Last Dose   • aspirin-acetaminophen-caffeine (EXCEDRIN MIGRAINE) 250-250-65 MG per tablet Take 1 tablet by mouth Every 6 (Six) Hours As Needed for Headache.   Past Week   • escitalopram (LEXAPRO) 10 MG tablet Take 10 mg by mouth Daily. Pt takes every other day.   Past Week   • esomeprazole (nexIUM) 20 MG capsule Take 20 mg by mouth As Needed.   Past Week         PMH:   Past Medical History:   Diagnosis Date   • Acid reflux    • Anxiety    • Hiatal hernia    • Shingles     history of    • Vertigo     history of... has resolved      PSH:    Past Surgical History:   Procedure Laterality Date   • WISDOM TOOTH EXTRACTION         Immunization History:  Influenza: 2020  Pneumococcal: No  Tetanus: No      Social History:   Tobacco:   Social History     Tobacco Use   Smoking Status Never Smoker   Smokeless Tobacco Current User   • Types: Chew      Alcohol:     Social History     Substance and Sexual Activity   Alcohol Use Not Currently         Physical Exam:/89 (BP Location: Right arm, Patient Position: Lying)   Pulse 66   Temp 97.8 °F (36.6 °C) (Temporal)   Resp 18   SpO2 100%       General Appearance:    Alert, cooperative, no distress, appears stated age   Head:    Normocephalic, without obvious abnormality, atraumatic   Lungs:     Clear to auscultation bilaterally, respirations  unlabored    Heart:   Regular rate and rhythm, S1 and S2 normal, no murmur, rub    or gallop    Abdomen:    Soft without tenderness   Breast Exam:    deferred   Genitalia:    deferred   Extremities:   Extremities normal, atraumatic, no cyanosis or edema   Skin:   Skin color, texture, turgor normal, no rashes or lesions   Neurologic:   Grossly intact     Results Review:     LABS:  Lab Results   Component Value Date    WBC 5.91 12/10/2020    HGB 16.6 12/10/2020    HCT 48.5 12/10/2020    MCV 85.2 12/10/2020     12/10/2020    NEUTROABS 3.25 12/10/2020    GLUCOSE 89 12/10/2020    BUN 11 12/10/2020    CREATININE 1.03 12/10/2020    EGFRIFNONA 77 12/10/2020     12/10/2020    K 4.6 12/10/2020    CL 99 12/10/2020    CO2 30.0 (H) 12/10/2020    CALCIUM 10.4 12/10/2020    ALBUMIN 5.00 12/10/2020    AST 14 12/10/2020    ALT 14 12/10/2020    BILITOT 0.8 12/10/2020       RADIOLOGY:  Study Result    EXAMINATION: XR CHEST PA AND LATERAL- 12/10/2020     INDICATION: pre op      COMPARISON: NONE     FINDINGS: PA and lateral views of the chest reveal cardiac and  mediastinal silhouettes within normal limits. The lung fields are  grossly clear. No focal parenchymal opacification present. No pleural  effusion or pneumothorax. The bony sutures are unremarkable. Minimal  degenerative changes seen within the spine. Pulmonary vascularity is  within normal limits.        IMPRESSION:  No acute cardiopulmonary disease.       I reviewed the patient's new clinical results.    Cancer Staging (if applicable)  Cancer Patient: __ yes __no __unknown; If yes, clinical stage T:__ N:__M:__, stage group or __N/A      Impression: Right hip pain      Plan: Right total hip arthroplasty      VIKA Jameson   12/21/2020   09:45 EST

## 2020-12-21 NOTE — PROGRESS NOTES
Immediate anterior subluxation noted in PACU.  This was easily reducible and secured with the hip slightly flexed and abducted.  X-ray showed tilt angle to 40 degrees and anteversion in the 25 to 30 degree range.  Limb lengths appear to be radiographically equal.  We will reassess after spinal anesthesia has resolved to determine if more aggressive measures are required.    Jared Mario MD  4:30 PM  12/21/2020

## 2020-12-22 VITALS
DIASTOLIC BLOOD PRESSURE: 79 MMHG | RESPIRATION RATE: 16 BRPM | HEART RATE: 67 BPM | OXYGEN SATURATION: 97 % | SYSTOLIC BLOOD PRESSURE: 104 MMHG | TEMPERATURE: 98.5 F

## 2020-12-22 PROBLEM — D62 ACUTE BLOOD LOSS ANEMIA: Status: ACTIVE | Noted: 2020-12-22

## 2020-12-22 LAB
ANION GAP SERPL CALCULATED.3IONS-SCNC: 9 MMOL/L (ref 5–15)
BASOPHILS # BLD AUTO: 0.03 10*3/MM3 (ref 0–0.2)
BASOPHILS NFR BLD AUTO: 0.3 % (ref 0–1.5)
BUN SERPL-MCNC: 14 MG/DL (ref 6–20)
BUN/CREAT SERPL: 14.4 (ref 7–25)
CALCIUM SPEC-SCNC: 8.6 MG/DL (ref 8.6–10.5)
CHLORIDE SERPL-SCNC: 102 MMOL/L (ref 98–107)
CO2 SERPL-SCNC: 25 MMOL/L (ref 22–29)
CREAT SERPL-MCNC: 0.97 MG/DL (ref 0.76–1.27)
DEPRECATED RDW RBC AUTO: 37.6 FL (ref 37–54)
EOSINOPHIL # BLD AUTO: 0.01 10*3/MM3 (ref 0–0.4)
EOSINOPHIL NFR BLD AUTO: 0.1 % (ref 0.3–6.2)
ERYTHROCYTE [DISTWIDTH] IN BLOOD BY AUTOMATED COUNT: 11.9 % (ref 12.3–15.4)
GFR SERPL CREATININE-BSD FRML MDRD: 82 ML/MIN/1.73
GLUCOSE SERPL-MCNC: 111 MG/DL (ref 65–99)
HCT VFR BLD AUTO: 34.4 % (ref 37.5–51)
HGB BLD-MCNC: 11.9 G/DL (ref 13–17.7)
IMM GRANULOCYTES # BLD AUTO: 0.03 10*3/MM3 (ref 0–0.05)
IMM GRANULOCYTES NFR BLD AUTO: 0.3 % (ref 0–0.5)
LYMPHOCYTES # BLD AUTO: 1.1 10*3/MM3 (ref 0.7–3.1)
LYMPHOCYTES NFR BLD AUTO: 12.4 % (ref 19.6–45.3)
MCH RBC QN AUTO: 29.9 PG (ref 26.6–33)
MCHC RBC AUTO-ENTMCNC: 34.6 G/DL (ref 31.5–35.7)
MCV RBC AUTO: 86.4 FL (ref 79–97)
MONOCYTES # BLD AUTO: 0.8 10*3/MM3 (ref 0.1–0.9)
MONOCYTES NFR BLD AUTO: 9 % (ref 5–12)
NEUTROPHILS NFR BLD AUTO: 6.87 10*3/MM3 (ref 1.7–7)
NEUTROPHILS NFR BLD AUTO: 77.9 % (ref 42.7–76)
NRBC BLD AUTO-RTO: 0 /100 WBC (ref 0–0.2)
PLATELET # BLD AUTO: 206 10*3/MM3 (ref 140–450)
PMV BLD AUTO: 10.4 FL (ref 6–12)
POTASSIUM SERPL-SCNC: 3.8 MMOL/L (ref 3.5–5.2)
RBC # BLD AUTO: 3.98 10*6/MM3 (ref 4.14–5.8)
SODIUM SERPL-SCNC: 136 MMOL/L (ref 136–145)
WBC # BLD AUTO: 8.84 10*3/MM3 (ref 3.4–10.8)

## 2020-12-22 PROCEDURE — 97116 GAIT TRAINING THERAPY: CPT

## 2020-12-22 PROCEDURE — 97161 PT EVAL LOW COMPLEX 20 MIN: CPT

## 2020-12-22 PROCEDURE — 97110 THERAPEUTIC EXERCISES: CPT

## 2020-12-22 PROCEDURE — 80048 BASIC METABOLIC PNL TOTAL CA: CPT | Performed by: INTERNAL MEDICINE

## 2020-12-22 PROCEDURE — 85025 COMPLETE CBC W/AUTO DIFF WBC: CPT | Performed by: ORTHOPAEDIC SURGERY

## 2020-12-22 PROCEDURE — 25010000002 CEFAZOLIN PER 500 MG: Performed by: ORTHOPAEDIC SURGERY

## 2020-12-22 RX ORDER — DOCUSATE SODIUM 100 MG/1
100 CAPSULE, LIQUID FILLED ORAL 2 TIMES DAILY
Qty: 30 CAPSULE | Refills: 0 | Status: SHIPPED | OUTPATIENT
Start: 2020-12-22 | End: 2021-01-06

## 2020-12-22 RX ORDER — MELOXICAM 15 MG/1
15 TABLET ORAL DAILY
Qty: 15 TABLET | Refills: 0 | Status: SHIPPED | OUTPATIENT
Start: 2020-12-22 | End: 2021-01-06

## 2020-12-22 RX ORDER — ASPIRIN 325 MG
325 TABLET, DELAYED RELEASE (ENTERIC COATED) ORAL DAILY
Qty: 30 TABLET | Refills: 0 | Status: SHIPPED | OUTPATIENT
Start: 2020-12-22 | End: 2021-01-21

## 2020-12-22 RX ORDER — OXYCODONE HYDROCHLORIDE 5 MG/1
5 TABLET ORAL EVERY 4 HOURS PRN
Qty: 40 TABLET | Refills: 0 | Status: SHIPPED | OUTPATIENT
Start: 2020-12-22

## 2020-12-22 RX ORDER — ACETAMINOPHEN 500 MG
1000 TABLET ORAL EVERY 6 HOURS
Qty: 56 TABLET | Refills: 0
Start: 2020-12-22 | End: 2020-12-29

## 2020-12-22 RX ADMIN — CEFAZOLIN SODIUM 2 G: 10 INJECTION, POWDER, FOR SOLUTION INTRAVENOUS at 05:32

## 2020-12-22 RX ADMIN — HYDROCODONE BITARTRATE AND ACETAMINOPHEN 1 TABLET: 5; 325 TABLET ORAL at 03:46

## 2020-12-22 RX ADMIN — HYDROCODONE BITARTRATE AND ACETAMINOPHEN 1 TABLET: 5; 325 TABLET ORAL at 11:58

## 2020-12-22 RX ADMIN — ASPIRIN 325 MG ORAL TABLET 325 MG: 325 PILL ORAL at 08:33

## 2020-12-22 RX ADMIN — ESCITALOPRAM OXALATE 10 MG: 10 TABLET ORAL at 08:33

## 2020-12-22 RX ADMIN — HYDROCODONE BITARTRATE AND ACETAMINOPHEN 1 TABLET: 5; 325 TABLET ORAL at 08:33

## 2020-12-22 RX ADMIN — PANTOPRAZOLE SODIUM 40 MG: 40 TABLET, DELAYED RELEASE ORAL at 08:33

## 2020-12-22 NOTE — PLAN OF CARE
Goal Outcome Evaluation:  Plan of Care Reviewed With: patient  Progress: improving  Outcome Summary: Pt VSS. A&O x4. Pt complained of pain/nausea and PRN meds given. NA and RN attempted to ambulate pt but it was not tolerated. Pt stated he got tunnel vision and very dizzy. Pt slept on and off through the night. Pillows under knee to keep leg straight. No dislocations noted. Will continue to monitor.

## 2020-12-22 NOTE — DISCHARGE SUMMARY
Patient Name: Dominguez Calderon  MRN: [id number]  : 1970  DOS: 2020    Attending: Jared Mario MD    Primary Care Provider: Tj Dunn MD    Date of Admission:.2020  8:38 AM    Date of Discharge:  2020    Discharge Diagnosis:     Status post total hip replacement, right    Arthritis of right hip    Anxiety    GERD (gastroesophageal reflux disease)      Hospital Course  At admit:  Patient is a pleasant 50 y.o. male presented for scheduled surgery by Dr. 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 X-rays show complete loss superior joint space with femoral head flattening and lateralization of the femoral head.  Pain is limiting routine daily activities. Risks benefits indications and rationale for total hip arthroplasty discussed at length with patient.  Patient is advised on possibility of perioperative medical complications requiring prolonged hospitalization as well as infectious or wound complications requiring further surgery.  Patient signs own consent after all questions are answered.)  Patient underwent right total hip arthroplasty, anterior, under spinal anesthesia tolerated surgery well, was admitted for further management.  Seen in his room postoperatively, he is doing well, good pain control.  No complaints of nausea, vomiting, or shortness of breath.  Patient has no history of DVT or PE.     After admit  Patient was provided pain medications as needed for pain control.  Adjustments were made to pain medications to optimize postop pain management when indicated. Risks and benefits of opiate medications discussed with patient.    He was seen by PT   has progressed well over his stay.    Patient used an IS for atelectasis prophylaxis and asked along with mechanicals for DVT prophylaxis.    Home medications were resumed as appropriate, and labs were monitored and remained fairly stable.     With  the progress he has made, Mr. Hernandez is ready for DC home today.      Discussed with patient regarding plan and he shows understanding and agreement.    Patient will have outpatient PT following discharge.        Procedures Performed  Procedure(s):  TOTAL ANTERIOR RIGHT HIP ARTHROPLASTY       Pertinent Test Results:    I reviewed the patient's new clinical results.   Results from last 7 days   Lab Units 20  0736   WBC 10*3/mm3 8.84   HEMOGLOBIN g/dL 11.9*   HEMATOCRIT % 34.4*   PLATELETS 10*3/mm3 206     Results from last 7 days   Lab Units 20  0735   SODIUM mmol/L 136   POTASSIUM mmol/L 3.8   CHLORIDE mmol/L 102   CO2 mmol/L 25.0   BUN mg/dL 14   CREATININE mg/dL 0.97   CALCIUM mg/dL 8.6   GLUCOSE mg/dL 111*     I reviewed the patient's new imaging including images and reports.  Results for SOLEDAD HERNANDEZ (MRN [id number]) as of 2020 09:37   Ref. Range 12/10/2020 17:20   Hemoglobin Latest Ref Range: 13.0 - 17.7 g/dL 16.6   Hematocrit Latest Ref Range: 37.5 - 51.0 % 48.5       Physical therapy  Plan of Care Reviewed With: patient  Progress: improving  Outcome Summary: Pt is progressing nicely with his rehab and increased ambulation distance to 700 feet with RW and CGA. Performs BLE ther ex with good recall and expect to recover well. Recommend d/c home with assist and OPPT and shows good safety awareness to be d/c when appropriate.    Discharge Assessment:      Visit Vitals  /64 (BP Location: Left arm, Patient Position: Lying)   Pulse 67   Temp 98.5 °F (36.9 °C) (Oral)   Resp 16   SpO2 97%     Temp (24hrs), Av.3 °F (36.8 °C), Min:97.8 °F (36.6 °C), Max:99.1 °F (37.3 °C)      General Appearance:    Alert, cooperative, in no acute distress   Lungs:     Clear to auscultation,respirations regular, even and                   unlabored    Heart:    Regular rhythm and normal rate, normal S1 and S2   Abdomen:     Normal bowel sounds, no masses, no organomegaly, soft        non-tender,  non-distended, no guarding, no rebound                 tenderness   Extremities:   CDI dressing aury dressing system right hip.  No clubbing, cyanosis, or edema distally.   Pulses:   Pulses palpable and equal bilaterally   Skin:   No bleeding, bruising or rash   Neurologic:   Cranial nerves 2 - 12 grossly intact, sensation intact, Flexion and dorsiflexion intact bilateral feet.         Discharge Disposition: Home         Discharge Medications      New Medications      Instructions Start Date   acetaminophen 500 MG tablet  Commonly known as: TYLENOL   1,000 mg, Oral, Every 6 Hours, Take every 6 hours as needed after 1 week.      aspirin  MG tablet   325 mg, Oral, Daily      docusate sodium 100 MG capsule  Commonly known as: COLACE   100 mg, Oral, 2 Times Daily      meloxicam 15 MG tablet  Commonly known as: MOBIC   15 mg, Oral, Daily      oxyCODONE 5 MG immediate release tablet  Commonly known as: Roxicodone   5 mg, Oral, Every 4 Hours PRN         Continue These Medications      Instructions Start Date   aspirin-acetaminophen-caffeine 250-250-65 MG per tablet  Commonly known as: EXCEDRIN MIGRAINE   1 tablet, Oral, Every 6 Hours PRN      escitalopram 10 MG tablet  Commonly known as: LEXAPRO   10 mg, Oral, Daily, Pt takes every other day.       esomeprazole 20 MG capsule  Commonly known as: nexIUM   20 mg, Oral, As Needed             Discharge Diet: Resume prehospital diet    Activity at Discharge: Weightbearing as tolerated right lower extremity, restrictions per orthopedic surgery.    Follow-up Appointments  Jared Mario MD per his orders    Dragon disclaimer:  Part of this encounter note is an electronic transcription/translation of spoken language to printed text. The electronic translation of spoken language may permit erroneous, or at times, nonsensical words or phrases to be inadvertently transcribed; Although I have reviewed the note for such errors, some may still exist.       Suraj Byrd,  MD  12/22/20  10:57 EST

## 2020-12-22 NOTE — PROGRESS NOTES
Discharge Planning Assessment  [facility]     Patient Name: Dominguez Calderon  MRN: [id number]  Today's Date: 12/22/2020    Admit Date: 12/21/2020    Discharge Needs Assessment     Row Name 12/22/20 1124       Living Environment    Lives With  spouse;child(amarjit), dependent    Name(s) of Who Lives With Patient  Wife:  Andreina Calderon, ph [phone]    Current Living Arrangements  home/apartment/condo    Family Caregiver if Needed  spouse    Quality of Family Relationships  unable to assess    Able to Return to Prior Arrangements  yes       Resource/Environmental Concerns    Resource/Environmental Concerns  none    Transportation Concerns  car, none       Transition Planning    Patient/Family Anticipates Transition to  home with family    Patient/Family Anticipated Services at Transition  outpatient care    Transportation Anticipated  family or friend will provide       Discharge Needs Assessment    Readmission Within the Last 30 Days  no previous admission in last 30 days    Equipment Currently Used at Home  none    Equipment Needed After Discharge  walker, rolling;commode    Discharge Facility/Level of Care Needs  outpatient therapy    Provided Post Acute Provider List?  Yes    Post Acute Provider List  DME Supplier;Outpatient Therapy    Provided Post Acute Provider Quality & Resource List?  Yes    Delivered To  Patient    Method of Delivery  In person    Patient's Choice of Community Agency(s)  [facility] and outpatient PT at  [facility]        Discharge Plan     Row Name 12/22/20 1127       Plan    Plan  Home with wife's assistance, Outpatient PT at [facility], a rolling walker and bedside commode from [facility]    Patient/Family in Agreement with Plan  yes    Plan Comments  Met with Mr. Calderon at the bedside for discharge planning.  Mr Calderon lives with his wife, Andreina, in a one story home, 2 steps to enter, in [facility].  He is ambulating with a rolling walker.  He requested a walker and  bedside commode for home use and did not have preference for provider.  Contacted [facility] and they are delivering the DME to the hospital room today.    Discussed physical therapy and Mr. Calderon requested [facility] Outpatient PT.  Called and faxed the orders to Linda at [facility] and scheduled appointment for Wed., 12/30/2020, at 9am.  Info is noted on follow up providers.    Mr. Calderon states that his wife can assist him at home as needed and will be transporting him home at discharge.    CM will continue to follow.    Final Discharge Disposition Code  01 - home or self-care        Continued Care and Services - Admitted Since 12/21/2020     Durable Medical Equipment Coordination complete    Service Provider Request Status Selected Services Address Phone Fax Patient Preferred    [facility]   Selected Durable Medical Equipment [address] [place] [id number] [phone] --              Expected Discharge Date and Time     Expected Discharge Date Expected Discharge Time    Dec 22, 2020         Demographic Summary     Row Name 12/22/20 1123       General Information    Admission Type  observation    Arrived From  home    Reason for Consult  discharge planning    General Information Comments  PCP:  Holland Dunn       Contact Information    Permission Granted to Share Info With          Functional Status     Row Name 12/22/20 1124       Functional Status    Usual Activity Tolerance  moderate    Current Activity Tolerance  -- See PT notes.       Functional Status, IADL    Medications  independent    Meal Preparation  independent    Housekeeping  independent    Laundry  independent    Shopping  independent       Mental Status    General Appearance WDL  WDL        Psychosocial    No documentation.       Abuse/Neglect    No documentation.       Legal    No documentation.       Substance Abuse    No  documentation.       Patient Forms    No documentation.           Moira Clark RN

## 2020-12-22 NOTE — PROGRESS NOTES
Dominguez Calderon  [id number]  1970    /64 (BP Location: Left arm, Patient Position: Lying)   Pulse 67   Temp 98.5 °F (36.9 °C) (Oral)   Resp 16   SpO2 97%     Lab Results (last 24 hours)     Procedure Component Value Units Date/Time    CBC & Differential [[id number]]  (Abnormal) Collected: 12/22/20 0736    Specimen: Blood Updated: 12/22/20 0834    Narrative:      The following orders were created for panel order CBC & Differential.  Procedure                               Abnormality         Status                     ---------                               -----------         ------                     CBC Auto Differential[[id number]]        Abnormal            Final result                 Please view results for these tests on the individual orders.    CBC Auto Differential [[id number]]  (Abnormal) Collected: 12/22/20 0736    Specimen: Blood Updated: 12/22/20 0834     WBC 8.84 10*3/mm3      RBC 3.98 10*6/mm3      Hemoglobin 11.9 g/dL      Hematocrit 34.4 %      MCV 86.4 fL      MCH 29.9 pg      MCHC 34.6 g/dL      RDW 11.9 %      RDW-SD 37.6 fl      MPV 10.4 fL      Platelets 206 10*3/mm3      Neutrophil % 77.9 %      Lymphocyte % 12.4 %      Monocyte % 9.0 %      Eosinophil % 0.1 %      Basophil % 0.3 %      Immature Grans % 0.3 %      Neutrophils, Absolute 6.87 10*3/mm3      Lymphocytes, Absolute 1.10 10*3/mm3      Monocytes, Absolute 0.80 10*3/mm3      Eosinophils, Absolute 0.01 10*3/mm3      Basophils, Absolute 0.03 10*3/mm3      Immature Grans, Absolute 0.03 10*3/mm3      nRBC 0.0 /100 WBC     Basic Metabolic Panel [[id number]] Collected: 12/22/20 0735    Specimen: Blood Updated: 12/22/20 0827          Patient Care Team:  Tj Dunn MD as PCP - General (Family Medicine)  Tj Dunn MD as Consulting Physician (Family Medicine)    SUBJECTIVE  Pain well controlled.  Good start with ambulation, limited by dizziness.    PHYSICAL EXAM  Incision benign  Minimal thigh  swelling  Neurovascularly intact to knees and toes       Status post total hip replacement, right    Arthritis of right hip    Anxiety    GERD (gastroesophageal reflux disease)      PLAN / DISPOSITION:  Hemoglobin 11.9 today.  No transfusion anticipated  Discharge home today if cleared by physical therapy  Patient is again instructed on anterior total hip precautions in his particular case.  When keeping the hip forward flexed he should have good functional stability.  Follow-up office 1 week.    Jared Mario MD  12/22/20  08:49 EST

## 2020-12-22 NOTE — DISCHARGE PLACEMENT REQUEST
"Dominguez Hernandez (50 y.o. Male)   Appointment has been made with Linda for Wed, .  Orders and face sheet.  From Moira Clark RN BSN, Case Management,  [phone].    Date of Birth Social Security Number Address Home Phone MRN    1970  [address]   [place] [id number] [phone] [id number]    Hindu Marital Status          None        Admission Date Admission Type Admitting Provider Attending Provider Department, Room/Bed    20 Elective Jared Mario MD [place], [place], MD [address], [address]1    Discharge Date Discharge Disposition Discharge Destination         Home or Self Care              Attending Provider: Suraj Byrd MD    Allergies: No Known Allergies    Isolation: None   Infection: None   Code Status: CPR    Ht: 175.3 cm (69\")   Wt: 83.7 kg (184 lb 9.6 oz)    Admission Cmt: None   Principal Problem: Status post total hip replacement, right [Z96.641]                 Active Insurance as of 2020     Primary Coverage     Payor Plan Insurance Group Employer/Plan Group    [facility] BL[employer] CR[employer] EMPL[employer] [id number]     Payor Plan Address Payor Plan Phone Number Payor Plan Fax Number Effective Dates    [address] [phone]  10/1/2020 - None Entered    [place] [address]       Subscriber Name Subscriber Birth Date Member ID       HARLEY HERNANDEZ 5/3/1969 [id number]                 Emergency Contacts      (Rel.) Home Phone Work Phone Mobile Phone    HARLEY HERNANDEZ (Spouse) -- -- [phone]        [address] [address]  Phone:  [phone]  Fax:  [phone] Date: Dec 22, 2020      Ambulatory Referral to Physical Therapy Evaluate and treat, Ortho; Full weight bearing     Patient:  Dominguez Hernandez MRN:  [id number]   PO   [facility] [address] :  1970  SSN:    Phone: [phone] Sex:  M      INSURANCE PAYOR PLAN GROUP # [id number] " ID   Primary:    YOLIS VALLE [id number]      Referring Provider Information:  IVAN SANTANA Phone: [phone] Fax: [phone]      Referral Information:   # Visits:  1 Referral Type: Physical Therapy [AE1]   Urgency:  Routine Referral Reason: Specialty Services Required   Start Date: Dec 22, 2020 End Date:  To be determined by Insurer   Diagnosis: Status post total hip replacement, right (Z96.641 [ICD-10-CM] V43.64 [ICD-9-CM])  Arthritis of right hip (M16.11 [ICD-10-CM] 716.95 [ICD-9-CM])      Refer to Dept:   Refer to Provider:   Refer to Facility:       Specialty needed: Evaluate and treat  Specialty needed: Ortho  Weight Bearing Status: Full weight bearing     This document serves as a request of services and does not constitute Insurance authorization or approval of services.  To determine eligibility, please contact the members Insurance carrier to verify and review coverage.     If you have medical questions regarding this request for services. Please contact [address] at [phone] during normal business hours.       Authorizing Provider:Ivan Santana MD  Authorizing Provider's NPI: [id number]  Order Entered By: Moira Clark RN 12/22/2020 11:19 AM     Electronically signed by: Ivan Santana MD 12/22/2020 11:19 AM               Operative/Procedure Notes (most recent note)      Ivan Santnaa MD at 12/21/20 1333        TOTAL HIP ARTHROPLASTY ANTERIOR  Progress Note    Dominguez Calderon  12/21/2020    Pre-op Diagnosis:   Right hip osteoarthritis       Post-Op Diagnosis Codes:  Right hip osteoarthritis    Procedure/CPT® Codes:  29997    Procedure(s):  TOTAL ANTERIOR RIGHT HIP ARTHROPLASTY    Surgeon(s):  Ivan Santana MD    Anesthesia: Spinal    Staff:   Circulator: Sofia Marion RN; Faustino Kwon RN  Radiology Technologist: Noble Wong  Scrub Person: Matilde April; Sheila Kaye  Vendor Representative: Collin Hyatt  Assistant: Tonya Quintanilla; Shama Rolon, PCT  Assistant: Stephanie Price PA  Assistant: Stephanie Price PA      Estimated Blood Loss: 500 mL    Urine Voided: * No values recorded between 12/21/2020  1:01 PM and 12/21/2020  3:13 PM *    Specimens:                None          Drains: * No LDAs found *    Findings: Eburnated bone anterior superior lateral.  Moderate anterior inferior impinging osteophyte.    Complications: None    Assistant: Stephanie Price PA  was responsible for performing the following activities: Retraction, Suction, Irrigation, Suturing, Closing, Placing Dressing and Debridement in preparation for component placement and their skilled assistance was necessary for the success of this case.    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 X-rays show complete loss superior joint space with femoral head flattening and lateralization of the femoral head.  Pain is limiting routine daily activities. Risks benefits indications and rationale for total hip arthroplasty discussed at length with patient.  Patient is advised on possibility of perioperative medical complications requiring prolonged hospitalization as well as infectious or wound complications requiring further surgery.  Patient signs own consent after all questions are answered.      Procedure:  While in the OR spinal anesthetic achieved with satisfactory level. Turned to the supine position and prepped and draped in standard fashion for anterior approach to the right hip on the [place] table. Fluoroscopy used to assess limb lengths. These were restored with final component selection and position. Incision made over the tensor fascia dann and routine dissection carried down to raise the fascia over the  medial margin of the tensor muscle belly. Circumflex vessels were identified and cauterized. Bleeding reasonably controlled with estimated total blood loss 500 mL. Femoral neck isolated. T-shaped capsulotomy created. Mild effusion relieved. Standard neck cut made and head removed without difficulty noting large area of eburnated bone in the anterior and superior lateral aspect of the femoral head and anterior inferior moderate-sized impinging osteophyte. Acetabulum was exposed circumferentially. Acetabulum reamed from size 47/56 noting satisfactory exposure of the subchondral bone with the size 56. Size 56 final component was seated with excellent press-fit characteristics.  1 additional fixation screw was placed due to concern for bone quality medially. Polyethylene insert placed without difficulty. Horizontal tilt was thought to be approximately 40-45° from Hilgenreiner's line. Anteversion approximately 25°. Component was under the anterior inferior acetabular margin. Acetabulum was thoroughly irrigated before during and after component placement.      Attention turned to the proximal femur. Trochanteric hook placed and hip externally rotated eventually to 160° after complete posterior lateral releases. Standard piriformis landmarks were used.  Minor soft stripping from the apex of the greater trochanter was noted.  Broached from size 0/1 to size 6 with good position relative to standard landmarks. Calcar reamer was passed after trials showed satisfactory recovery of limb lengths.  Retractors initiated the focal rim defect on the posterior aspect involving the calcar.  Final component seated completely relative to the reaming with excellent rotational stability. Limb lengths appeared to be restored best with +4x36 mm head and neck adapter. Final components assembled and reduced.  Stability at this point appear to be good up to 100 degrees with the hip in neutral extension and complete with the hip even slightly  flexed.  Wound thoroughly irrigated and closed in layers without a drain. Bleeding appeared to be well controlled at closure. Joint space injected with ropivacaine through a spinal needle placed percutaneously during closure. Standard [place] dressing applied. Final counts were correct. Patient stable to recovery having tolerated procedure well throughout.          Jared Mario MD     Date: 12/21/2020  Time: 15:17 EST        Electronically signed by Jared Mario MD at 12/21/20 1432

## 2020-12-22 NOTE — THERAPY TREATMENT NOTE
Patient Name: Dominguez Calderon  : 1970    MRN: [id number]                              Today's Date: 2020       Admit Date: 2020    Visit Dx:     ICD-10-CM ICD-9-CM   1. Status post total hip replacement, right  Z96.641 V43.64   2. Arthritis of right hip  M16.11 716.95     Patient Active Problem List   Diagnosis   • Arthritis of right hip   • Anxiety   • GERD (gastroesophageal reflux disease)   • Status post total hip replacement, right   • Acute blood loss anemia     Past Medical History:   Diagnosis Date   • Acid reflux    • Anxiety    • Hiatal hernia    • Shingles     history of    • Vertigo     history of... has resolved      Past Surgical History:   Procedure Laterality Date   • TOTAL HIP ARTHROPLASTY Right 2020    Procedure: TOTAL ANTERIOR RIGHT HIP ARTHROPLASTY;  Surgeon: Jared Mario MD;  [facility];  Service: Orthopedics;  Laterality: Right;   • WISDOM TOOTH EXTRACTION       General Information     Row Name 20 1409 20 0929       Physical Therapy Time and Intention    Document Type  therapy note (daily note)  -  evaluation  -    Mode of Treatment  physical therapy  -  physical therapy  -    Row Name 20 1409 20 0929       General Information    Patient Profile Reviewed  yes  -  yes  -    Prior Level of Function  --  independent:;all household mobility;community mobility;gait;transfer;driving;ADL's;work;using stairs;bed mobility  -    Existing Precautions/Restrictions  hip, anterior;right  -  hip, anterior;right  -    Barriers to Rehab  --  none identified  -    Row Name 20 0929          Living Environment    Lives With  spouse;child(amarjit), dependent  -     Row Name 20 0929          Home Main Entrance    Number of Stairs, Main Entrance  three  -     Stair Railings, Main Entrance  none  -     Row Name 2029          Stairs Within Home, Primary    Number of Stairs, Within Home, Primary  none  -     Stair  Railings, Within Home, Primary  none  -     Row Name 12/22/20 1409 12/22/20 0929       Cognition    Orientation Status (Cognition)  oriented x 4  -  oriented x 4  -    Row Name 12/22/20 1409 12/22/20 0929       Safety Issues, Functional Mobility    Safety Issues Affecting Function (Mobility)  sequencing abilities;safety precaution awareness  -  sequencing abilities;safety precaution awareness  -    Impairments Affecting Function (Mobility)  balance;range of motion (ROM);pain;strength  -  balance;range of motion (ROM);pain;strength  -    Comment, Safety Issues/Impairments (Mobility)  --  alert, following commands  -      User Key  (r) = Recorded By, (t) = Taken By, (c) = Cosigned By    Initials Name Provider Type     Magnus Cheung, PT Physical Therapist        Mobility     Row Name 12/22/20 1409 12/22/20 0930       Bed Mobility    Bed Mobility  --  supine-sit;scooting/bridging  -    Scooting/Bridging [place] (Bed Mobility)  --  supervision;verbal cues  -    Supine-Sit [place] (Bed Mobility)  --  supervision;verbal cues  -    Assistive Device (Bed Mobility)  --  bed rails;head of bed elevated  -    Comment (Bed Mobility)  [facility]  -  Verbal cues for correct sequencing.  -    Row Name 12/22/20 1409 12/22/20 0930       Transfers    Comment (Transfers)  verbal cues for correct sequencing provided to improve safety.  -  Verbal cues for hand placement onto RW safely.  -    Row Name 12/22/20 0930          Bed-Chair Transfer    Bed-Chair [place] (Transfers)  supervision;verbal cues  -     Row Name 12/22/20 1409 12/22/20 0930       Sit-Stand Transfer    Sit-Stand [place] (Transfers)  modified independence  -  contact guard;verbal cues  -    Assistive Device (Sit-Stand Transfers)  walker, front-wheeled  -  walker, front-wheeled  -    Row Name 12/22/20 1409 12/22/20 0930       Gait/Stairs (Locomotion)    [place] Level (Gait)  contact guard;1 person assist;verbal  cues  -  contact guard;1 person assist;verbal cues  -    Assistive Device (Gait)  walker, front-wheeled  -  walker, front-wheeled  -    Distance in Feet (Gait)  700  -  350  -    Deviations/Abnormal Patterns (Gait)  bilateral deviations;gait speed decreased;weight shifting decreased;stride length decreased  -  bilateral deviations;gait speed decreased;weight shifting decreased;stride length decreased  -    Bilateral Gait Deviations  heel strike decreased;forward flexed posture  -  heel strike decreased;forward flexed posture  -    Right Sided Gait Deviations  heel strike decreased  -  heel strike decreased  -    [place] Level (Stairs)  not tested  -  contact guard;verbal cues;1 person assist  -    Assistive Device (Stairs)  --  walker, front-wheeled  -    Handrail Location (Stairs)  --  both sides  -    Number of Steps (Stairs)  --  3 x 3 times  -    Ascending Technique (Stairs)  --  step-over-step  -    Descending Technique (Stairs)  --  step-over-step  -    Stairs, Safety Issues  --  weight-shifting ability decreased  -    Stairs, Impairments  --  strength decreased;ROM decreased  -    Comment (Gait/Stairs)  Pt ambulated with step through pattern and focused on improving upright posture and step length. Provided edu to pt on importance of progressing mobility and increasing weight bearing through LE.  -  Pt ambulated with step through pattern and decreased gait speeds with good pain tolerance. No LOB or buckling. Navigated 3 steps 3 times with good safety awareness and good eccentric control during descent. Edu provided on sequencing and pt able to demonstrate correctly.  -    Row Name 12/22/20 1409 12/22/20 0930       Mobility    Extremity Weight-bearing Status  right lower extremity  -  right lower extremity  -    Right Lower Extremity (Weight-bearing Status)  weight-bearing as tolerated (WBAT)  -  weight-bearing as tolerated (WBAT)  -      User Key  (r)  = Recorded By, (t) = Taken By, (c) = Cosigned By    Initials Name Provider Type     Magnus Cheung PT Physical Therapist        Obj/Interventions     [facility] Name 12/22/20 1004          Range of Motion Comprehensive    General Range of Motion  bilateral lower extremity ROM WFL  -[facility] Name 12/22/20 1004          Strength Comprehensive (MMT)    General Manual Muscle Testing (MMT) Assessment  lower extremity strength deficits identified  -     Comment, General Manual Muscle Testing (MMT) Assessment  RLE grossly 4-/5  -[facility] Name 12/22/20 1411 12/22/20 1004       Motor Skills    Therapeutic Exercise  hip;knee;ankle  -  hip;knee;ankle  -[facility] Name 12/22/20 1411 12/22/20 1004       Hip (Therapeutic Exercise)    Hip (Therapeutic Exercise)  isometric exercises;AROM (active range of motion)  -  AROM (active range of motion);isometric exercises  -    Hip AROM (Therapeutic Exercise)  aBduction;10 repetitions  -  aBduction;10 repetitions  -    Hip Isometrics (Therapeutic Exercise)  gluteal sets;10 repetitions  -  gluteal sets;10 repetitions  -[facility] Name 12/22/20 1411 12/22/20 1004       Knee (Therapeutic Exercise)    Knee (Therapeutic Exercise)  isometric exercises;strengthening exercise  -  isometric exercises;strengthening exercise  -    Knee Isometrics (Therapeutic Exercise)  quad sets;10 repetitions  -  quad sets;10 repetitions  -    Knee Strengthening (Therapeutic Exercise)  SLR (straight leg raise);SAQ (short arc quad);LAQ (long arc quad);heel slides;10 repetitions  -  SLR (straight leg raise);LAQ (long arc quad);heel slides;10 repetitions  -[facility] Name 12/22/20 1411 12/22/20 1004       Ankle (Therapeutic Exercise)    Ankle (Therapeutic Exercise)  AROM (active range of motion)  -  AROM (active range of motion)  -    Ankle AROM (Therapeutic Exercise)  bilateral;dorsiflexion;plantarflexion;10 repetitions;2 sets  -  bilateral;dorsiflexion;plantarflexion;10 repetitions;2 sets   -[facility] Name 12/22/20 1411 12/22/20 1004       Balance    Balance Assessment  sitting static balance;sitting dynamic balance;standing static balance;standing dynamic balance  -  sitting static balance;sitting dynamic balance;standing static balance;standing dynamic balance  -    Static Sitting Balance  WNL;sitting, edge of bed  -  WNL;sitting, edge of bed  -    Dynamic Sitting Balance  WNL;sitting, edge of bed  -  WNL;sitting, edge of bed  -    Static Standing Balance  WFL;supported;standing  -  WFL;supported;standing  -    Dynamic Standing Balance  supported;standing;WFL  -  mild impairment;standing;unsupported  -    Comment, Balance  no LOB noted  -  no LOB with gait or stairs, transferred bed > chair with mildly impaired balance but safe  -[facility] Name 12/22/20 1004          Sensory Assessment (Somatosensory)    Sensory Assessment (Somatosensory)  sensation intact  -       User Key  (r) = Recorded By, (t) = Taken By, (c) = Cosigned By    Initials Name Provider Type     Magnus Cheung, PT Physical Therapist        Goals/Plan     [facility] Name 12/22/20 1012          Bed Mobility Goal 1 (PT)    Activity/Assistive Device (Bed Mobility Goal 1, PT)  bed mobility activities, all  -     [place] Level/Cues Needed (Bed Mobility Goal 1, PT)  independent  -     Time Frame (Bed Mobility Goal 1, PT)  short term goal (STG);3 days  -[facility] Name 12/22/20 1012          Transfer Goal 1 (PT)    Activity/Assistive Device (Transfer Goal 1, PT)  sit-to-stand/stand-to-sit  -     [place] Level/Cues Needed (Transfer Goal 1, PT)  independent  -     Time Frame (Transfer Goal 1, PT)  long term goal (LTG);5 days  -[facility] Name 12/22/20 1012          Gait Training Goal 1 (PT)    Activity/Assistive Device (Gait Training Goal 1, PT)  gait (walking locomotion);walker, rolling  -     [place] Level (Gait Training Goal 1, PT)  independent  -     Distance (Gait Training Goal 1, PT)  500  feet  -     Time Frame (Gait Training Goal 1, PT)  long term goal (LTG);5 days  -     Row Name 12/22/20 1012          Stairs Goal 1 (PT)    Activity/Assistive Device (Stairs Goal 1, PT)  stairs, all skills  -     [place] Level/Cues Needed (Stairs Goal 1, PT)  independent  -     Number of Stairs (Stairs Goal 1, PT)  3  -     Time Frame (Stairs Goal 1, PT)  long term goal (LTG);5 days  [facility]       User Key  (r) = Recorded By, (t) = Taken By, (c) = Cosigned By    Initials Name Provider Type     Magnus Cheung, PT Physical Therapist        Clinical Impression     Row Name 12/22/20 1412 12/22/20 1008       Pain    Additional Documentation  Pain Scale: FACES Pre/Post-Treatment (Group)  [facility]  Pain Scale: Numbers Pre/Post-Treatment (Group)  [facility]    Row Name 12/22/20 1412 12/22/20 1008       Pain Scale: Numbers Pre/Post-Treatment    Pretreatment Pain Rating  --  5/10  -    Posttreatment Pain Rating  --  5/10  -    Pain Location - Side  --  Right  -    Pain Location - Orientation  --  incisional  -    Pain Location  --  hip  -    Pre/Posttreatment Pain Comment  --  tolerated session  -    Pain Intervention(s)  Repositioned;Ambulation/increased activity  -  Repositioned;Ambulation/increased activity  -    Row Name 12/22/20 1412          Pain Scale: FACES Pre/Post-Treatment    Pain: FACES Scale, Pretreatment  2-->hurts little bit  -     Posttreatment Pain Rating  2-->hurts little bit  -     Pain Location - Side  Right  -     Pain Location - Orientation  incisional  -     Pain Location  hip  -     Row Name 12/22/20 1412 12/22/20 1008       Plan of Care Review    Plan of Care Reviewed With  patient  -  patient  -    Progress  improving  -  --    Outcome Summary  Pt is progressing nicely with his rehab and increased ambulation distance to 700 feet with RW and CGA. Performs BLE ther ex with good recall and expect to recover well. Recommend d/c home with assist and OPPT and shows good  safety awareness to be d/c when appropriate.  -  PT eval complete. Pt requires supervision with bed mobility and CGA for transfers, gait, and stair training. Ambulated 350 feet with RW and navigated 3 steps 3 times safely. No LOB and no buckling noted. Anterior hip precautions and HEP reviewed with packet provided. PADD = 20.  Appropriate to d/c home when medically ready. Recommend home health PT and home with assist at d/c.  -[facility] Name 12/22/20 1412 12/22/20 1008       Therapy Assessment/Plan (PT)    Patient/Family Therapy Goals Statement (PT)  --  return to OF  -    Rehab Potential (PT)  good, to achieve stated therapy goals  -  good, to achieve stated therapy goals  -    Criteria for Skilled Interventions Met (PT)  yes;skilled treatment is necessary  -  yes;skilled treatment is necessary  -    Predicted Duration of Therapy Intervention (PT)  --  5 days  -[facility] Name 12/22/20 1412 12/22/20 1008       Vital Signs    Pre Systolic BP Rehab  --  100  -JH    Pre Treatment Diastolic BP  --  64  -JH    Post Systolic BP Rehab  --  130  -JH    Post Treatment Diastolic BP  --  70  -    Pretreatment Heart Rate (beats/min)  --  68  -    Posttreatment Heart Rate (beats/min)  --  70  -    Pre SpO2 (%)  --  98  -    O2 Delivery Pre Treatment  --  room air  -    O2 Delivery Intra Treatment  --  room air  -    Post SpO2 (%)  --  96  -    O2 Delivery Post Treatment  --  room air  -    Pre Patient Position  Sitting  -  Supine  -    Intra Patient Position  Standing  -  Standing  -    Post Patient Position  Sitting  -  Sitting  -[facility] Name 12/22/20 1412 12/22/20 1008       Positioning and Restraints    Pre-Treatment Position  sitting in chair/recliner  -  in bed  -    Post Treatment Position  chair  -  chair  -    In Chair  reclined;call light within reach;encouraged to call for assist;exit alarm on;compression device;legs elevated  -  notified nsg;reclined;call light  within reach;encouraged to call for assist;exit alarm on;compression device;legs elevated  -      User Key  (r) = Recorded By, (t) = Taken By, (c) = Cosigned By    Initials Name Provider Type     Magnus Cheung PT Physical Therapist        Outcome Measures     Row Name 12/22/20 1415 12/22/20 1007       How much help from another person do you currently need...    Turning from your back to your side while in flat bed without using bedrails?  4  -  4  -    Moving from lying on back to sitting on the side of a flat bed without bedrails?  4  -  4  -JH    Moving to and from a bed to a chair (including a wheelchair)?  3  -  3  -JH    Standing up from a chair using your arms (e.g., wheelchair, bedside chair)?  4  -  3  -JH    Climbing 3-5 steps with a railing?  3  -  3  -JH    To walk in hospital room?  4  -  3  -    AM-PAC 6 Clicks Score (PT)  22  -  20  -    Row Name 12/22/20 1007          PADD    Diagnosis  2  -     Gender  2  -     Age Group  2  -     Gait Distance  1  -     Assist Level  1  -     Home Support  3  -     PADD Score  11  -     Patient Preference  home with home health  -     Prediction by PADD Score  directly home (with home health or out-patient rehab)  -     Row Name 12/22/20 1415 12/22/20 1007       Functional Assessment    Outcome Measure Options  AM-PAC 6 Clicks Basic Mobility (PT)  -  AM-PAC 6 Clicks Basic Mobility (PT);PADD  -      User Key  (r) = Recorded By, (t) = Taken By, (c) = Cosigned By    Initials Name Provider Type    Magnus Acharya, PT Physical Therapist        Physical Therapy Education                 Title: PT OT SLP Therapies (Done)     Topic: Physical Therapy (Done)     Point: Mobility training (Done)     Learning Progress Summary           Patient Acceptance, E, VU by  at 12/22/2020 1416    Comment: edu on safety during mobility and d/c planning    Acceptance, E, VU by  at 12/22/2020 1014    Comment: edu on PT services, POC,  HEP, precautions, and d/c planning                   Point: Home exercise program (Done)     Learning Progress Summary           Patient Acceptance, E, VU by  at 12/22/2020 1416    Comment: edu on safety during mobility and d/c planning    Acceptance, E, VU by  at 12/22/2020 1014    Comment: edu on PT services, POC, HEP, precautions, and d/c planning                   Point: Body mechanics (Done)     Learning Progress Summary           Patient Acceptance, E, VU by  at 12/22/2020 1416    Comment: edu on safety during mobility and d/c planning    Acceptance, E, VU by  at 12/22/2020 1014    Comment: edu on PT services, POC, HEP, precautions, and d/c planning                   Point: Precautions (Done)     Learning Progress Summary           Patient Acceptance, E, VU by  at 12/22/2020 1416    Comment: edu on safety during mobility and d/c planning    Acceptance, E, VU by  at 12/22/2020 1014    Comment: edu on PT services, POC, HEP, precautions, and d/c planning                               User Key     Initials Effective Dates Name Provider Type Discipline     09/22/20 -  Magnus Cheung, PT Physical Therapist PT              PT Recommendation and Plan  Planned Therapy Interventions (PT): balance training, bed mobility training, gait training, home exercise program, patient/family education, postural re-education, ROM (range of motion), stair training, strengthening, stretching, transfer training  Plan of Care Reviewed With: patient  Progress: improving  Outcome Summary: Pt is progressing nicely with his rehab and increased ambulation distance to 700 feet with RW and CGA. Performs BLE ther ex with good recall and expect to recover well. Recommend d/c home with assist and OPPT and shows good safety awareness to be d/c when appropriate.     Time Calculation:   PT Charges     Row Name 12/22/20 1417 12/22/20 1015          Time Calculation    Start Time  1257  -  0855  -     PT Received On  12/22/20  -   12/22/20  -     PT Goal Re-Cert Due Date  01/01/21  -  01/01/21  -        Time Calculation- PT    Total Timed Code Minutes- PT  24 minute(s)  -  12 minute(s)  -        Timed Charges    52591 - PT Therapeutic Exercise Minutes  10  -  --     23904 - Gait Training Minutes   14  -  12  -       User Key  (r) = Recorded By, (t) = Taken By, (c) = Cosigned By    Initials Name Provider Type    Magnus Acharya, PT Physical Therapist        Therapy Charges for Today     Code Description Service Date Service Provider Modifiers Qty    56483728606 HC GAIT TRAINING EA 15 MIN 12/22/2020 Magnus Cheung, PT GP 1    94093224012 HC PT EVAL LOW COMPLEXITY 3 12/22/2020 Magnus Cheung, PT GP 1    97129760056 HC PT THER PROC EA 15 MIN 12/22/2020 Magnus Cheung, PT GP 1    36125110210 HC GAIT TRAINING EA 15 MIN 12/22/2020 Magnus Cheung, PT GP 1          PT G-Codes  Outcome Measure Options: AM-PAC 6 Clicks Basic Mobility (PT)  AM-PAC 6 Clicks Score (PT): 22    Magnus Cheung PT  12/22/2020

## 2020-12-22 NOTE — THERAPY EVALUATION
Patient Name: Dominguez Calderon  : 1970    MRN: [id number]                              Today's Date: 2020       Admit Date: 2020    Visit Dx: No diagnosis found.  Patient Active Problem List   Diagnosis   • Arthritis of right hip   • Anxiety   • GERD (gastroesophageal reflux disease)   • Status post total hip replacement, right     Past Medical History:   Diagnosis Date   • Acid reflux    • Anxiety    • Hiatal hernia    • Shingles     history of    • Vertigo     history of... has resolved      Past Surgical History:   Procedure Laterality Date   • TOTAL HIP ARTHROPLASTY Right 2020    Procedure: TOTAL ANTERIOR RIGHT HIP ARTHROPLASTY;  Surgeon: Jared Mario MD;  Location: [facility];  Service: Orthopedics;  Laterality: Right;   • WISDOM TOOTH EXTRACTION       General Information     Row Name 20          Physical Therapy Time and Intention    Document Type  evaluation  -     Mode of Treatment  physical therapy  -     Row Name 20          General Information    Patient Profile Reviewed  yes  -     Prior Level of Function  independent:;all household mobility;community mobility;gait;transfer;driving;ADL's;work;using stairs;bed mobility  -     Existing Precautions/Restrictions  hip, anterior;right  -     Barriers to Rehab  none identified  -     Row Name 20          Living Environment    Lives With  spouse;child(amarjit), dependent  -     Row Name 20          Home Main Entrance    Number of Stairs, Main Entrance  three  -     Stair Railings, Main Entrance  none  -     Row Name 20          Stairs Within Home, Primary    Number of Stairs, Within Home, Primary  none  -     Stair Railings, Within Home, Primary  none  -     Row Name 20          Cognition    Orientation Status (Cognition)  oriented x 4  -     Row Name 20          Safety Issues, Functional Mobility    Safety Issues Affecting Function  (Mobility)  sequencing abilities;safety precaution awareness  -     Impairments Affecting Function (Mobility)  balance;range of motion (ROM);pain;strength  -     Comment, Safety Issues/Impairments (Mobility)  alert, following commands  -       User Key  (r) = Recorded By, (t) = Taken By, (c) = Cosigned By    Initials Name Provider Type     Magnus Cheung, PT Physical Therapist        Mobility     Row Name 12/22/20 0930          Bed Mobility    Bed Mobility  supine-sit;scooting/bridging  -     Scooting/Bridging [place] (Bed Mobility)  supervision;verbal cues  -     Supine-Sit [place] (Bed Mobility)  supervision;verbal cues  -     Assistive Device (Bed Mobility)  bed rails;head of bed elevated  -     Comment (Bed Mobility)  Verbal cues for correct sequencing.  -     Row Name 12/22/20 0930          Transfers    Comment (Transfers)  Verbal cues for hand placement onto RW safely.  -     Row Name 12/22/20 0930          Bed-Chair Transfer    Bed-Chair [place] (Transfers)  supervision;verbal cues  -     Row Name 12/22/20 0930          Sit-Stand Transfer    Sit-Stand [place] (Transfers)  contact guard;verbal cues  -     Assistive Device (Sit-Stand Transfers)  walker, front-wheeled  -     Row Name 12/22/20 0930          Gait/Stairs (Locomotion)    [place] Level (Gait)  contact guard;1 person assist;verbal cues  -     Assistive Device (Gait)  walker, front-wheeled  -     Distance in Feet (Gait)  350  -     Deviations/Abnormal Patterns (Gait)  bilateral deviations;gait speed decreased;weight shifting decreased;stride length decreased  -     Bilateral Gait Deviations  heel strike decreased;forward flexed posture  -     Right Sided Gait Deviations  heel strike decreased  -     [place] Level (Stairs)  contact guard;verbal cues;1 person assist  -     Assistive Device (Stairs)  walker, front-wheeled  [facility]     Handrail Location (Stairs)  both sides  -     Number  of Steps (Stairs)  3 x 3 times  -     Ascending Technique (Stairs)  step-over-step  -     Descending Technique (Stairs)  step-over-step  -     Stairs, Safety Issues  weight-shifting ability decreased  -     Stairs, Impairments  strength decreased;ROM decreased  -     Comment (Gait/Stairs)  Pt ambulated with step through pattern and decreased gait speeds with good pain tolerance. No LOB or buckling. Navigated 3 steps 3 times with good safety awareness and good eccentric control during descent. Edu provided on sequencing and pt able to demonstrate correctly.  -[facility] Name 12/22/20 0930          Mobility    Extremity Weight-bearing Status  right lower extremity  -     Right Lower Extremity (Weight-bearing Status)  weight-bearing as tolerated (WBAT)  -       User Key  (r) = Recorded By, (t) = Taken By, (c) = Cosigned By    Initials Name Provider Type     Magnus Cheung, PT Physical Therapist        Obj/Interventions     [facility] Name 12/22/20 1004          Range of Motion Comprehensive    General Range of Motion  bilateral lower extremity ROM WFL  -JH     Row Name 12/22/20 1004          Strength Comprehensive (MMT)    General Manual Muscle Testing (MMT) Assessment  lower extremity strength deficits identified  -     Comment, General Manual Muscle Testing (MMT) Assessment  RLE grossly 4-/5  -[facility] Name 12/22/20 1004          Motor Skills    Therapeutic Exercise  hip;knee;ankle  -[facility] Name 12/22/20 1004          Hip (Therapeutic Exercise)    Hip (Therapeutic Exercise)  AROM (active range of motion);isometric exercises  -     Hip AROM (Therapeutic Exercise)  aBduction;10 repetitions  -     Hip Isometrics (Therapeutic Exercise)  gluteal sets;10 repetitions  -[facility] Name 12/22/20 1004          Knee (Therapeutic Exercise)    Knee (Therapeutic Exercise)  isometric exercises;strengthening exercise  -     Knee Isometrics (Therapeutic Exercise)  quad sets;10 repetitions  -     Knee  Strengthening (Therapeutic Exercise)  SLR (straight leg raise);LAQ (long arc quad);heel slides;10 repetitions  -[facility] Name 12/22/20 1004          Ankle (Therapeutic Exercise)    Ankle (Therapeutic Exercise)  AROM (active range of motion)  [facility]     Ankle AROM (Therapeutic Exercise)  bilateral;dorsiflexion;plantarflexion;10 repetitions;2 sets  -[facility] Name 12/22/20 1004          Balance    Balance Assessment  sitting static balance;sitting dynamic balance;standing static balance;standing dynamic balance  -     Static Sitting Balance  WNL;sitting, edge of bed  -     Dynamic Sitting Balance  WNL;sitting, edge of bed  -     Static Standing Balance  WFL;supported;standing  -     Dynamic Standing Balance  mild impairment;standing;unsupported  -     Comment, Balance  no LOB with gait or stairs, transferred bed > chair with mildly impaired balance but safe  -[facility] Name 12/22/20 1004          Sensory Assessment (Somatosensory)    Sensory Assessment (Somatosensory)  sensation intact  -       User Key  (r) = Recorded By, (t) = Taken By, (c) = Cosigned By    Initials Name Provider Type     Magnus Cheung, PT Physical Therapist        Goals/Plan     Row Name 12/22/20 1012          Bed Mobility Goal 1 (PT)    Activity/Assistive Device (Bed Mobility Goal 1, PT)  bed mobility activities, all  -     [place] Level/Cues Needed (Bed Mobility Goal 1, PT)  independent  -     Time Frame (Bed Mobility Goal 1, PT)  short term goal (STG);3 days  -[facility] Name 12/22/20 1012          Transfer Goal 1 (PT)    Activity/Assistive Device (Transfer Goal 1, PT)  sit-to-stand/stand-to-sit  -     [place] Level/Cues Needed (Transfer Goal 1, PT)  independent  -     Time Frame (Transfer Goal 1, PT)  long term goal (LTG);5 days  -[facility] Name 12/22/20 1012          Gait Training Goal 1 (PT)    Activity/Assistive Device (Gait Training Goal 1, PT)  gait (walking locomotion);walker, rolling  [facility]      [place] Level (Gait Training Goal 1, PT)  independent  -     Distance (Gait Training Goal 1, PT)  500 feet  -     Time Frame (Gait Training Goal 1, PT)  long term goal (LTG);5 days  -     Row Name 12/22/20 1012          Stairs Goal 1 (PT)    Activity/Assistive Device (Stairs Goal 1, PT)  stairs, all skills  -     [place] Level/Cues Needed (Stairs Goal 1, PT)  independent  -     Number of Stairs (Stairs Goal 1, PT)  3  -     Time Frame (Stairs Goal 1, PT)  long term goal (LTG);5 days  [facility]       User Key  (r) = Recorded By, (t) = Taken By, (c) = Cosigned By    Initials Name Provider Type     Magnus Cheung, PT Physical Therapist        Clinical Impression     Row Name 12/22/20 1008          Pain    Additional Documentation  Pain Scale: Numbers Pre/Post-Treatment (Group)  -[facility] Name 12/22/20 1008          Pain Scale: Numbers Pre/Post-Treatment    Pretreatment Pain Rating  5/10  -     Posttreatment Pain Rating  5/10  -     Pain Location - Side  Right  -     Pain Location - Orientation  incisional  -     Pain Location  hip  -     Pre/Posttreatment Pain Comment  tolerated session  -     Pain Intervention(s)  Repositioned;Ambulation/increased activity  -     Row Name 12/22/20 1008          Plan of Care Review    Plan of Care Reviewed With  patient  -     Outcome Summary  PT eval complete. Pt requires supervision with bed mobility and CGA for transfers, gait, and stair training. Ambulated 350 feet with RW and navigated 3 steps 3 times safely. No LOB and no buckling noted. Anterior hip precautions and HEP reviewed with packet provided. PADD = 20.  Appropriate to d/c home when medically ready. Recommend home health PT and home with assist at d/c.  -     Row Name 12/22/20 1008          Therapy Assessment/Plan (PT)    Patient/Family Therapy Goals Statement (PT)  return to [facility]  -     Rehab Potential (PT)  good, to achieve stated therapy goals  -     Criteria for Skilled  Interventions Met (PT)  yes;skilled treatment is necessary  -     Predicted Duration of Therapy Intervention (PT)  5 days  -     Row Name 12/22/20 1008          Vital Signs    Pre Systolic BP Rehab  100  -JH     Pre Treatment Diastolic BP  64  -JH     Post Systolic BP Rehab  130  -JH     Post Treatment Diastolic BP  70  -JH     Pretreatment Heart Rate (beats/min)  68  -JH     Posttreatment Heart Rate (beats/min)  70  -JH     Pre SpO2 (%)  98  -JH     O2 Delivery Pre Treatment  room air  -     O2 Delivery Intra Treatment  room air  -     Post SpO2 (%)  96  -JH     O2 Delivery Post Treatment  room air  -     Pre Patient Position  Supine  -     Intra Patient Position  Standing  -     Post Patient Position  Sitting  -     Row Name 12/22/20 1008          Positioning and Restraints    Pre-Treatment Position  in bed  -     Post Treatment Position  chair  -     In Chair  notified nsg;reclined;call light within reach;encouraged to call for assist;exit alarm on;compression device;legs elevated  -       User Key  (r) = Recorded By, (t) = Taken By, (c) = Cosigned By    Initials Name Provider Type     Magnus Cheung, PT Physical Therapist        Outcome Measures     Row Name 12/22/20 1007          How much help from another person do you currently need...    Turning from your back to your side while in flat bed without using bedrails?  4  -JH     Moving from lying on back to sitting on the side of a flat bed without bedrails?  4  -JH     Moving to and from a bed to a chair (including a wheelchair)?  3  -JH     Standing up from a chair using your arms (e.g., wheelchair, bedside chair)?  3  -     Climbing 3-5 steps with a railing?  3  -     To walk in hospital room?  3  -     AM-PAC 6 Clicks Score (PT)  20  -     Row Name 12/22/20 1007          PADD    Diagnosis  2  -     Gender  2  -     Age Group  2  -     Gait Distance  1  -     Assist Level  1  -     Home Support  3  -     PADD Score   11  -     Patient Preference  home with home health  -     Prediction by PADD Score  directly home (with home health or out-patient rehab)  -     Row Name 12/22/20 1007          Functional Assessment    Outcome Measure Options  AM-PAC 6 Clicks Basic Mobility (PT);PADD  -       User Key  (r) = Recorded By, (t) = Taken By, (c) = Cosigned By    Initials Name Provider Type     Magnus Cheung PT Physical Therapist        Physical Therapy Education                 Title: PT OT SLP Therapies (Done)     Topic: Physical Therapy (Done)     Point: Mobility training (Done)     Learning Progress Summary           Patient Acceptance, E, VU by  at 12/22/2020 1014    Comment: edu on PT services, POC, HEP, precautions, and d/c planning                   Point: Home exercise program (Done)     Learning Progress Summary           Patient Acceptance, E, VU by  at 12/22/2020 1014    Comment: edu on PT services, POC, HEP, precautions, and d/c planning                   Point: Body mechanics (Done)     Learning Progress Summary           Patient Acceptance, E, VU by  at 12/22/2020 1014    Comment: edu on PT services, POC, HEP, precautions, and d/c planning                   Point: Precautions (Done)     Learning Progress Summary           Patient Acceptance, E, VU by  at 12/22/2020 1014    Comment: edu on PT services, POC, HEP, precautions, and d/c planning                               User Key     Initials Effective Dates Name Provider Type [facility] 09/22/20 -  Magnus Cheung PT Physical Therapist PT              PT Recommendation and Plan  Planned Therapy Interventions (PT): balance training, bed mobility training, gait training, home exercise program, patient/family education, postural re-education, ROM (range of motion), stair training, strengthening, stretching, transfer training  Plan of Care Reviewed With: patient  Outcome Summary: PT eval complete. Pt requires supervision with bed mobility and CGA for  transfers, gait, and stair training. Ambulated 350 feet with RW and navigated 3 steps 3 times safely. No LOB and no buckling noted. Anterior hip precautions and HEP reviewed with packet provided. PADD = 20.  Appropriate to d/c home when medically ready. Recommend home health PT and home with assist at d/c.     Time Calculation:   PT Charges     Row Name 12/22/20 1015             Time Calculation    Start Time  0855  -      PT Received On  12/22/20  [facility]      PT Goal Re-Cert Due Date  01/01/21  -         Time Calculation- PT    Total Timed Code Minutes- PT  12 minute(s)  -         Timed Charges    59570 - Gait Training Minutes   12  -        User Key  (r) = Recorded By, (t) = Taken By, (c) = Cosigned By    Initials Name Provider Type    Magnus Acharya, PT Physical Therapist        Therapy Charges for Today     Code Description Service Date Service Provider Modifiers Qty    10122857290 HC GAIT TRAINING EA 15 MIN 12/22/2020 Magnus Cheung, PT GP 1    63081711762 HC PT EVAL LOW COMPLEXITY 3 12/22/2020 Magnus Cheung, PT GP 1          PT G-Codes  Outcome Measure Options: AM-PAC 6 Clicks Basic Mobility (PT), PADD  AM-PAC 6 Clicks Score (PT): 20    Magnus Cheung PT  12/22/2020

## 2020-12-22 NOTE — PLAN OF CARE
Goal Outcome Evaluation:  Plan of Care Reviewed With: patient  Progress: improving  Outcome Summary: Pt is progressing nicely with his rehab and increased ambulation distance to 700 feet with RW and CGA. Performs BLE ther ex with good recall and expect to recover well. Recommend d/c home with assist and OPPT and shows good safety awareness to be d/c when appropriate.

## 2020-12-23 LAB
BH BB BLOOD EXPIRATION DATE: NORMAL
BH BB BLOOD EXPIRATION DATE: NORMAL
BH BB BLOOD TYPE BARCODE: 9500
BH BB BLOOD TYPE BARCODE: 9500
BH BB DISPENSE STATUS: NORMAL
BH BB DISPENSE STATUS: NORMAL
BH BB PRODUCT CODE: NORMAL
BH BB PRODUCT CODE: NORMAL
BH BB UNIT NUMBER: NORMAL
BH BB UNIT NUMBER: NORMAL
CROSSMATCH INTERPRETATION: NORMAL
CROSSMATCH INTERPRETATION: NORMAL
UNIT  ABO: NORMAL
UNIT  ABO: NORMAL
UNIT  RH: NORMAL
UNIT  RH: NORMAL

## 2024-07-22 ENCOUNTER — OFFICE VISIT (OUTPATIENT)
Dept: FAMILY MEDICINE CLINIC | Facility: CLINIC | Age: 54
End: 2024-07-22
Payer: COMMERCIAL

## 2024-07-22 ENCOUNTER — LAB (OUTPATIENT)
Dept: FAMILY MEDICINE CLINIC | Facility: CLINIC | Age: 54
End: 2024-07-22
Payer: COMMERCIAL

## 2024-07-22 VITALS
TEMPERATURE: 98.4 F | DIASTOLIC BLOOD PRESSURE: 78 MMHG | BODY MASS INDEX: 28.47 KG/M2 | RESPIRATION RATE: 18 BRPM | WEIGHT: 192.2 LBS | SYSTOLIC BLOOD PRESSURE: 112 MMHG | HEIGHT: 69 IN | OXYGEN SATURATION: 98 % | HEART RATE: 60 BPM

## 2024-07-22 DIAGNOSIS — Z00.00 WELL ADULT EXAM: ICD-10-CM

## 2024-07-22 DIAGNOSIS — Z00.00 WELL ADULT EXAM: Primary | ICD-10-CM

## 2024-07-22 DIAGNOSIS — Z12.5 PROSTATE CANCER SCREENING: ICD-10-CM

## 2024-07-22 DIAGNOSIS — Z23 NEED FOR ZOSTER VACCINATION: ICD-10-CM

## 2024-07-22 DIAGNOSIS — E03.9 ACQUIRED HYPOTHYROIDISM: ICD-10-CM

## 2024-07-22 DIAGNOSIS — K04.7 ABSCESSED TOOTH: ICD-10-CM

## 2024-07-22 DIAGNOSIS — I48.0 PAROXYSMAL ATRIAL FIBRILLATION: ICD-10-CM

## 2024-07-22 LAB
ALBUMIN SERPL-MCNC: 4.8 G/DL (ref 3.5–5.2)
ALBUMIN/GLOB SERPL: 1.8 G/DL
ALP SERPL-CCNC: 68 U/L (ref 39–117)
ALT SERPL W P-5'-P-CCNC: 16 U/L (ref 1–41)
ANION GAP SERPL CALCULATED.3IONS-SCNC: 9 MMOL/L (ref 5–15)
AST SERPL-CCNC: 21 U/L (ref 1–40)
BILIRUB SERPL-MCNC: 0.4 MG/DL (ref 0–1.2)
BILIRUB UR QL STRIP: NEGATIVE
BUN SERPL-MCNC: 18 MG/DL (ref 6–20)
BUN/CREAT SERPL: 15.3 (ref 7–25)
CALCIUM SPEC-SCNC: 9.8 MG/DL (ref 8.6–10.5)
CHLORIDE SERPL-SCNC: 101 MMOL/L (ref 98–107)
CHOLEST SERPL-MCNC: 193 MG/DL (ref 0–200)
CLARITY UR: ABNORMAL
CO2 SERPL-SCNC: 26 MMOL/L (ref 22–29)
COLOR UR: YELLOW
CREAT SERPL-MCNC: 1.18 MG/DL (ref 0.76–1.27)
DEPRECATED RDW RBC AUTO: 42 FL (ref 37–54)
EGFRCR SERPLBLD CKD-EPI 2021: 74.2 ML/MIN/1.73
ERYTHROCYTE [DISTWIDTH] IN BLOOD BY AUTOMATED COUNT: 12.8 % (ref 12.3–15.4)
GLOBULIN UR ELPH-MCNC: 2.6 GM/DL
GLUCOSE SERPL-MCNC: 71 MG/DL (ref 65–99)
GLUCOSE UR STRIP-MCNC: NEGATIVE MG/DL
HBA1C MFR BLD: 5.1 % (ref 4.8–5.6)
HCT VFR BLD AUTO: 47.5 % (ref 37.5–51)
HCV AB SER QL: NORMAL
HDLC SERPL-MCNC: 42 MG/DL (ref 40–60)
HGB BLD-MCNC: 16 G/DL (ref 13–17.7)
HGB UR QL STRIP.AUTO: NEGATIVE
KETONES UR QL STRIP: ABNORMAL
LDLC SERPL CALC-MCNC: 116 MG/DL (ref 0–100)
LDLC/HDLC SERPL: 2.64 {RATIO}
LEUKOCYTE ESTERASE UR QL STRIP.AUTO: NEGATIVE
MCH RBC QN AUTO: 30.5 PG (ref 26.6–33)
MCHC RBC AUTO-ENTMCNC: 33.7 G/DL (ref 31.5–35.7)
MCV RBC AUTO: 90.5 FL (ref 79–97)
NITRITE UR QL STRIP: NEGATIVE
PH UR STRIP.AUTO: 5.5 [PH] (ref 5–8)
PLATELET # BLD AUTO: 253 10*3/MM3 (ref 140–450)
PMV BLD AUTO: 10.8 FL (ref 6–12)
POTASSIUM SERPL-SCNC: 4.7 MMOL/L (ref 3.5–5.2)
PROT SERPL-MCNC: 7.4 G/DL (ref 6–8.5)
PROT UR QL STRIP: NEGATIVE
PSA SERPL-MCNC: 0.52 NG/ML (ref 0–4)
RBC # BLD AUTO: 5.25 10*6/MM3 (ref 4.14–5.8)
SODIUM SERPL-SCNC: 136 MMOL/L (ref 136–145)
SP GR UR STRIP: 1.03 (ref 1–1.03)
TRIGL SERPL-MCNC: 201 MG/DL (ref 0–150)
TSH SERPL DL<=0.05 MIU/L-ACNC: 4.6 UIU/ML (ref 0.27–4.2)
UROBILINOGEN UR QL STRIP: ABNORMAL
VIT B12 BLD-MCNC: 394 PG/ML (ref 211–946)
VLDLC SERPL-MCNC: 35 MG/DL (ref 5–40)
WBC NRBC COR # BLD AUTO: 4.97 10*3/MM3 (ref 3.4–10.8)

## 2024-07-22 PROCEDURE — 80061 LIPID PANEL: CPT | Performed by: FAMILY MEDICINE

## 2024-07-22 PROCEDURE — 83036 HEMOGLOBIN GLYCOSYLATED A1C: CPT | Performed by: FAMILY MEDICINE

## 2024-07-22 PROCEDURE — 84439 ASSAY OF FREE THYROXINE: CPT | Performed by: FAMILY MEDICINE

## 2024-07-22 PROCEDURE — 99214 OFFICE O/P EST MOD 30 MIN: CPT | Performed by: FAMILY MEDICINE

## 2024-07-22 PROCEDURE — 99386 PREV VISIT NEW AGE 40-64: CPT | Performed by: FAMILY MEDICINE

## 2024-07-22 PROCEDURE — 90471 IMMUNIZATION ADMIN: CPT | Performed by: FAMILY MEDICINE

## 2024-07-22 PROCEDURE — G0103 PSA SCREENING: HCPCS | Performed by: FAMILY MEDICINE

## 2024-07-22 PROCEDURE — 82607 VITAMIN B-12: CPT | Performed by: FAMILY MEDICINE

## 2024-07-22 PROCEDURE — 90715 TDAP VACCINE 7 YRS/> IM: CPT | Performed by: FAMILY MEDICINE

## 2024-07-22 PROCEDURE — 80050 GENERAL HEALTH PANEL: CPT | Performed by: FAMILY MEDICINE

## 2024-07-22 PROCEDURE — 36415 COLL VENOUS BLD VENIPUNCTURE: CPT | Performed by: FAMILY MEDICINE

## 2024-07-22 PROCEDURE — 86803 HEPATITIS C AB TEST: CPT | Performed by: FAMILY MEDICINE

## 2024-07-22 PROCEDURE — 84480 ASSAY TRIIODOTHYRONINE (T3): CPT | Performed by: FAMILY MEDICINE

## 2024-07-22 PROCEDURE — 81003 URINALYSIS AUTO W/O SCOPE: CPT | Performed by: FAMILY MEDICINE

## 2024-07-22 RX ORDER — ASPIRIN 81 MG/1
81 TABLET ORAL DAILY
COMMUNITY

## 2024-07-22 RX ORDER — LEVOTHYROXINE SODIUM 0.03 MG/1
TABLET ORAL
COMMUNITY
Start: 2024-07-15

## 2024-07-22 RX ORDER — DILTIAZEM HYDROCHLORIDE 120 MG/1
1 CAPSULE, COATED, EXTENDED RELEASE ORAL DAILY
COMMUNITY
Start: 2024-07-15

## 2024-07-22 NOTE — PROGRESS NOTES
Male Physical Note      Date: 2024   Patient Name: Dominguez Calderon  : 1970   MRN: [id number]     Chief Complaint:    Chief Complaint   Patient presents with    Hospital Follow Up Visit    Atrial Fibrillation       History of Present Illness: Dominguez Calderon is a 53 y.o. male who is here today for their annual health maintenance and physical.  Patient has been doing relatively well since last being seen but was recently hospitalized at [facility] secondary to palpitations.  Patient was noted to be in atrial fibrillation with rapid ventricular response and was cardioverted with diltiazem.  Patient has remained in normal sinus rhythm since then and is scheduled to be seen for possible ablation.  Patient is in sinus rhythm at present time.  He does have a strong family history of atrial fibrillation and has been drinking energy drinks which she has discontinued in July.  Patient appears to be doing otherwise well.  They have continue with their medications without any side effects.  They have not had any changes in their usual activity, appetite and sleep.  Patient denies any other cardiovascular, respiratory, gastrointestinal, urologic or neurologic complaints.      Subjective      Review of Systems:   Review of Systems   Constitutional:  Negative for activity change, appetite change and fatigue.   Respiratory:  Negative for cough, chest tightness, shortness of breath and wheezing.    Cardiovascular:  Positive for palpitations. Negative for chest pain and leg swelling.   Gastrointestinal:  Negative for abdominal distention, abdominal pain, blood in stool, constipation, diarrhea, nausea, vomiting, GERD and indigestion.   Genitourinary:  Negative for difficulty urinating, dysuria, flank pain, frequency, hematuria and urgency.   Musculoskeletal:  Negative for arthralgias, back pain, gait problem, joint swelling and myalgias.   Neurological:  Negative for dizziness, tremors,  seizures, syncope, weakness, light-headedness, numbness, headache and memory problem.   Psychiatric/Behavioral:  Negative for sleep disturbance and depressed mood. The patient is not nervous/anxious.        Past Medical History, Social History, Family History and Care Team were all reviewed with patient and updated as appropriate.     Medications:     Current Outpatient Medications:     aspirin 81 MG EC tablet, Take 1 tablet by mouth Daily., Disp: , Rfl:     aspirin-acetaminophen-caffeine (EXCEDRIN MIGRAINE) 250-250-65 MG per tablet, Take 1 tablet by mouth Every 6 (Six) Hours As Needed for Headache., Disp: , Rfl:     dilTIAZem CD (CARDIZEM CD) 120 MG 24 hr capsule, Take 1 capsule by mouth Daily., Disp: , Rfl:     esomeprazole (nexIUM) 20 MG capsule, Take 1 capsule by mouth As Needed., Disp: , Rfl:     levothyroxine (SYNTHROID, LEVOTHROID) 25 MCG tablet, TAKE ONE TABLET BY MOUTH EVERY DAY AT 6:00. PLEASE CHECK YOUR TSH IN 3 WEEKS, Disp: , Rfl:     Zoster Vac Recomb Adjuvanted 50 MCG/0.5ML reconstituted suspension, Inject 0.5 mL into the appropriate muscle as directed by prescriber 1 (One) Time for 1 dose., Disp: 1 each, Rfl: 0    Allergies:   No Known Allergies    Immunizations:  Health Maintenance Summary            Overdue - COLORECTAL CANCER SCREENING (View Topic Details) Never done      No completion, postpone, or frequency change history exists for this topic.              Ordered - HEPATITIS C SCREENING (Once) Ordered on 7/22/2024      No completion, postpone, or frequency change history exists for this topic.              Ordered - ZOSTER VACCINE (1 of 2) Ordered on 7/22/2024      No completion, postpone, or frequency change history exists for this topic.              Overdue - BMI FOLLOWUP (Yearly) Overdue since 12/22/2021 12/22/2020  Registry Metric: BMI Follow-up              Postponed - COVID-19 Vaccine (3 - 2023-24 season) Postponed until 7/22/2025 07/22/2024  Postponed until 7/22/2025 by  "Gabriela Kwon MA (Patient Refused)    02/26/2021  Imm Admin: COVID-19 (MODERNA) 1st,2nd,3rd Dose Monovalent    01/27/2021  Imm Admin: COVID-19 (MODERNA) 1st,2nd,3rd Dose Monovalent              INFLUENZA VACCINE (Yearly - August to March) Next due on 8/1/2024      10/18/2022  Imm Admin: Fluzone (or Fluarix & Flulaval for VFC) >6mos    11/03/2020  Imm Admin: Flu Vaccine Quad PF >36MO    11/03/2020  Imm Admin: flucelvax quad pfs =>4 YRS    11/03/2020  Imm Admin: Fluzone (or Fluarix & Flulaval for VFC) >6mos    10/22/2019  Imm Admin: Flu Vaccine Quad PF >36MO    Only the first 5 history entries have been loaded, but more history exists.              ANNUAL PHYSICAL (Yearly) Next due on 7/22/2025 07/22/2024  Done              TDAP/TD VACCINES (2 - Td or Tdap) Next due on 7/22/2034 07/22/2024  Imm Admin: Tdap              Pneumococcal Vaccine 0-64 (Series Information) Aged Out      No completion, postpone, or frequency change history exists for this topic.                    Orders Placed This Encounter   Procedures    Tdap Vaccine Greater Than or Equal To 8yo IM       Colorectal Screening:   Patient states is up-to-date through  [place].  We will obtain a copy of the colonoscopy.  Last Completed Colonoscopy       This patient has no relevant Health Maintenance data.          CT for Smoker (Age 50-80, 20pk yr within last 15 years): Deferred.  Bone Density/DEXA (high risk): Deferred.  Hep C (Age 18-79 once): Ordered.  HIV (Age 15-65 once) : Deferred.  PSA (Over age 50, C Level Recommendation): Ordered.  US Aorta (For male smokers, age 65): Deferred.  A1c:   Hemoglobin A1C   Date Value Ref Range Status   12/10/2020 5.10 4.80 - 5.60 % Final     Lipid panel: No results found for: \"LABLIPI\"    The ASCVD Risk score (Emiliano DK, et al., 2019) failed to calculate for the following reasons:    Cannot find a previous HDL lab    Cannot find a previous total cholesterol lab    Dermatology: Advise if necessary.  Ophthalmologist: " "Advised if necessary.  Dentist: Up-to-date.    Tobacco Use: High Risk (7/22/2024)    Patient History     Smoking Tobacco Use: Never     Smokeless Tobacco Use: Current     Passive Exposure: Past       Social History     Substance and Sexual Activity   Alcohol Use Not Currently        Social History     Substance and Sexual Activity   Drug Use Never        Diet/Physical activity: Well-balanced diet/daily exercise.    Sexual health: No issues at present time.    PHQ-2 Depression Screening  PHQ-9 Total Score: 0      Measures:   Advanced Care Planning:   Patient does not have an advance directive, information provided.    Smoking Cessation:   Non-smoker.     Objective     Physical Exam:  Vital Signs:   Vitals:    07/22/24 0828   BP: 112/78   BP Location: Left arm   Patient Position: Sitting   Cuff Size: Adult   Pulse: 60   Resp: 18   Temp: 98.4 °F (36.9 °C)   TempSrc: Temporal   SpO2: 98%   Weight: 87.2 kg (192 lb 3.2 oz)   Height: 175.3 cm (69\")     Body mass index is 28.38 kg/m².     Physical Exam  Vitals and nursing note reviewed.   Constitutional:       Appearance: Normal appearance.   HENT:      Head: Normocephalic and atraumatic.      Nose: Nose normal.      Mouth/Throat:      Pharynx: Oropharynx is clear.   Eyes:      Extraocular Movements: Extraocular movements intact.      Pupils: Pupils are equal, round, and reactive to light.   Neck:      Thyroid: No thyroid mass or thyromegaly.      Trachea: Trachea normal.   Cardiovascular:      Rate and Rhythm: Normal rate and regular rhythm.      Pulses: Normal pulses. No decreased pulses.      Heart sounds: Normal heart sounds.   Pulmonary:      Effort: Pulmonary effort is normal.      Breath sounds: Normal breath sounds.   Abdominal:      General: Abdomen is flat. Bowel sounds are normal.      Palpations: Abdomen is soft.      Tenderness: There is no abdominal tenderness.   Musculoskeletal:      Cervical back: Neck supple.      Right lower leg: No edema.      Left lower " leg: No edema.   Lymphadenopathy:      Cervical: No cervical adenopathy.   Skin:     General: Skin is warm and dry.   Neurological:      General: No focal deficit present.      Mental Status: He is alert and oriented to person, place, and time.      Sensory: Sensation is intact.      Motor: Motor function is intact.      Coordination: Coordination is intact.   Psychiatric:         Attention and Perception: Attention normal.         Mood and Affect: Mood normal.         Speech: Speech normal.         Behavior: Behavior normal.          POCT Results (if applicable):   Results for orders placed or performed during the hospital encounter of 12/21/20   Basic Metabolic Panel    Specimen: Blood   Result Value Ref Range    Glucose 111 (H) 65 - 99 mg/dL    BUN 14 6 - 20 mg/dL    Creatinine 0.97 0.76 - 1.27 mg/dL    Sodium 136 136 - 145 mmol/L    Potassium 3.8 3.5 - 5.2 mmol/L    Chloride 102 98 - 107 mmol/L    CO2 25.0 22.0 - 29.0 mmol/L    Calcium 8.6 8.6 - 10.5 mg/dL    eGFR Non African Amer 82 >60 mL/min/1.73    BUN/Creatinine Ratio 14.4 7.0 - 25.0    Anion Gap 9.0 5.0 - 15.0 mmol/L   CBC Auto Differential    Specimen: Blood   Result Value Ref Range    WBC 8.84 3.40 - 10.80 10*3/mm3    RBC 3.98 (L) 4.14 - 5.80 10*6/mm3    Hemoglobin 11.9 (L) 13.0 - 17.7 g/dL    Hematocrit 34.4 (L) 37.5 - 51.0 %    MCV 86.4 79.0 - 97.0 fL    MCH 29.9 26.6 - 33.0 pg    MCHC 34.6 31.5 - 35.7 g/dL    RDW 11.9 (L) 12.3 - 15.4 %    RDW-SD 37.6 37.0 - 54.0 fl    MPV 10.4 6.0 - 12.0 fL    Platelets 206 140 - 450 10*3/mm3    Neutrophil % 77.9 (H) 42.7 - 76.0 %    Lymphocyte % 12.4 (L) 19.6 - 45.3 %    Monocyte % 9.0 5.0 - 12.0 %    Eosinophil % 0.1 (L) 0.3 - 6.2 %    Basophil % 0.3 0.0 - 1.5 %    Immature Grans % 0.3 0.0 - 0.5 %    Neutrophils, Absolute 6.87 1.70 - 7.00 10*3/mm3    Lymphocytes, Absolute 1.10 0.70 - 3.10 10*3/mm3    Monocytes, Absolute 0.80 0.10 - 0.90 10*3/mm3    Eosinophils, Absolute 0.01 0.00 - 0.40 10*3/mm3    Basophils,  Absolute 0.03 0.00 - 0.20 10*3/mm3    Immature Grans, Absolute 0.03 0.00 - 0.05 10*3/mm3    nRBC 0.0 0.0 - 0.2 /100 WBC   Prepare RBC, 2 Units   Result Value Ref Range    Product Code K7328P87     Unit Number [id number]     UNIT  ABO O     UNIT  RH NEG     Crossmatch Interpretation Compatible     Dispense Status RE     Blood Expiration Date 202012252359     Blood Type Barcode 9500     Product Code W0708S07     Unit Number [id number]     UNIT  ABO O     UNIT  RH NEG     Crossmatch Interpretation Compatible     Dispense Status RE     Blood Expiration Date 202012262359     Blood Type Barcode 9500    Type & Screen    Specimen: Blood   Result Value Ref Range    ABO Type O     RH type Negative     Antibody Screen Positive     T&S Expiration Date 12/24/2020 11:59:59 PM    Antibody Identification    Specimen: Blood   Result Value Ref Range    Anti-C ANTI-C        Procedures    Assessment / Plan      Assessment/Plan:   Diagnoses and all orders for this visit:    1. Well adult exam (Primary)  Patient did have a wellness exam performed today that did not reveal any abnormality.  Patient's anxiety/depression scores were reviewed.  We will continue to monitor laboratory data as well as symptomatology and if there are any other questions or concerns prior to the next scheduled follow-up they will contact us.  -     CBC (No Diff); Future  -     Comprehensive Metabolic Panel; Future  -     Hemoglobin A1c; Future  -     Hepatitis C Antibody; Future  -     Lipid Panel; Future  -     TSH Rfx On Abnormal To Free T4; Future  -     Vitamin B12; Future  -     Urinalysis without microscopic (no culture) - Urine, Clean Catch; Future  -     Tdap Vaccine Greater Than or Equal To 6yo IM    2. Paroxysmal atrial fibrillation   Patient did have episodes of atrial fibrillation but appears to be in normal sinus rhythm at present time.  He is tolerating his medications currently and is scheduled to see the cardiologist for possible  ablation.  We will continue with his current regimen.  His [id number] 2 score is 0.  We will continue to monitor and if he has recurrent atrial fibrillation with rapid ventricular response further evaluation treatment will be necessary.    3. Acquired hypothyroidism   Patient did have elevated TSH but apparently had a normal T4.  He was started on low-dose of Synthroid.  We will recheck laboratory data and will pursue treat accordingly.    4. Prostate cancer screening  PSA will be obtained.  -     PSA Screen; Future    5. Need for zoster vaccination  Patient is a candidate to receive the shingle vaccine.  We have discussed the risk and benefits of the shingle vaccine and will send the request to the pharmacy.  -     Zoster Vac Recomb Adjuvanted 50 MCG/0.5ML reconstituted suspension; Inject 0.5 mL into the appropriate muscle as directed by prescriber 1 (One) Time for 1 dose.  Dispense: 1 each; Refill: 0    6. Abscessed tooth   Patient was recently diagnosed with abscessed tooth.  He did finish a course of penicillin and is scheduled to have a root canal.  He is asymptomatic present time.  If he does have symptoms he will contact the dentist for further evaluation treatment.       Healthcare Maintenance:  Counseling provided based on age appropriate USPSTF guidelines.  BMI is >= 25 and <30. (Overweight) The following options were offered after discussion;: exercise counseling/recommendations and nutrition counseling/recommendations    Dominguez Calderon voices understanding and acceptance of this advice and will call back with any further questions or concerns. AVS with preventive healthcare tips printed for patient.     Follow Up:   Return in about 6 months (around 1/22/2025).    At [facility], we believe that sharing information builds trust and better relationships. You are receiving this note because you recently visited [facility]. It is possible you will see health information before a provider has talked  with you about it. This kind of information can be easy to misunderstand. To help you fully understand what it means for your health, we urge you to discuss this note with your provider.    Tj Dunn MD  [facility]

## 2024-07-23 LAB
T3 SERPL-MCNC: 140 NG/DL (ref 80–200)
T4 FREE SERPL-MCNC: 1.42 NG/DL (ref 0.92–1.68)

## 2024-08-21 DIAGNOSIS — E03.9 ACQUIRED HYPOTHYROIDISM: Primary | ICD-10-CM

## 2024-08-21 RX ORDER — LEVOTHYROXINE SODIUM 0.03 MG/1
25 TABLET ORAL DAILY
Qty: 30 TABLET | Refills: 3 | Status: SHIPPED | OUTPATIENT
Start: 2024-08-21

## (undated) DEVICE — PK MAJ TOTL HIP ANT 10

## (undated) DEVICE — CVR HNDL LIGHT RIGID

## (undated) DEVICE — ANTIBACTERIAL UNDYED BRAIDED (POLYGLACTIN 910), SYNTHETIC ABSORBABLE SUTURE: Brand: COATED VICRYL

## (undated) DEVICE — GLV SURG SENSICARE PI MIC PF SZ6.5 LF STRL

## (undated) DEVICE — GLV SURG PREMIERPRO MIC LTX PF SZ6.5 BRN

## (undated) DEVICE — HANDPIECE SET WITH HIGH FLOW TIP AND SUCTION TUBE: Brand: INTERPULSE

## (undated) DEVICE — SYR LUERLOK 50ML

## (undated) DEVICE — GLV SURG DERMASSURE GRN LF PF SZ 6.5

## (undated) DEVICE — GLV SURG DERMASSURE GRN LF PF 7.0

## (undated) DEVICE — GLV SURG PREMIERPRO MIC LTX PF SZ7 BRN

## (undated) DEVICE — STRYKER PERFORMANCE SERIES SAGITTAL BLADE: Brand: STRYKER PERFORMANCE SERIES

## (undated) DEVICE — GLV SURG SIGNATURE TOUCH PF LTX 8 STRL BX/50

## (undated) DEVICE — GLV SURG SENSICARE PI MIC PF SZ7 LF STRL

## (undated) DEVICE — STERILE PVP: Brand: MEDLINE INDUSTRIES, INC.

## (undated) DEVICE — GLV SURG PREMIERPRO MIC LTX PF SZ8.5 BRN

## (undated) DEVICE — BNDG ELAS CO-FLEX SLF ADHR 4IN5YD LF STRL

## (undated) DEVICE — 3M™ DURAPORE™ SURGICAL TAPE 1538-3, 3 INCH X 10 YARD (7,5CM X 9,1M), 4 ROLLS/BOX: Brand: 3M™ DURAPORE™